# Patient Record
Sex: MALE
[De-identification: names, ages, dates, MRNs, and addresses within clinical notes are randomized per-mention and may not be internally consistent; named-entity substitution may affect disease eponyms.]

---

## 2017-03-24 ENCOUNTER — HOSPITAL ENCOUNTER (OUTPATIENT)
Dept: HOSPITAL 74 - JASU-SURG | Age: 62
Discharge: HOME | End: 2017-03-24
Attending: SPECIALIST
Payer: COMMERCIAL

## 2017-03-24 VITALS — SYSTOLIC BLOOD PRESSURE: 126 MMHG | HEART RATE: 60 BPM | DIASTOLIC BLOOD PRESSURE: 75 MMHG

## 2017-03-24 VITALS — BODY MASS INDEX: 28.1 KG/M2

## 2017-03-24 VITALS — TEMPERATURE: 97.5 F

## 2017-03-24 DIAGNOSIS — K40.90: ICD-10-CM

## 2017-03-24 DIAGNOSIS — K40.00: Primary | ICD-10-CM

## 2017-03-24 PROCEDURE — 0YUA0JZ SUPPLEMENT BILATERAL INGUINAL REGION WITH SYNTHETIC SUBSTITUTE, OPEN APPROACH: ICD-10-PCS | Performed by: SPECIALIST

## 2017-03-24 NOTE — OP
Operative Note





- Note:


Operative Date: 03/24/17


Operation: Repair of bilateral incarcerated inguinal hernia , with plug and 

mesh.  The right side was also a sliding hernia.


Findings: 


 Sliding hernia on right with a large , indirect sac , and a lipoma of the cord.


   Left inguinal hernia , direct type on the medial edge of the inguinal canal 

, with incarcerated fat.


Surgeon: Chante Kennedy


Anesthesiologist/CRNA: Teressa Mehta


Anesthesia: General


Specimens Removed: 1)Right indirect sac.  2) Lipoma of the cord on the right,.  

3) lipoma of the cord on the left.


Estimated Blood Loss (mls): 20


Operative Report Dictated: Yes

## 2017-03-25 NOTE — OP
DATE OF OPERATION:  03/24/2017

 

PREOPERATIVE DIAGNOSIS:  Bilateral incarcerated inguinal hernia.

 

POSTOPERATIVE DIAGNOSIS:  Bilateral incarcerated inguinal hernia, right-sided 
sliding

type.

 

OPERATIVE PROCEDURE:  Repair of bilateral incarcerated inguinal hernia with 
plug and

mesh with the right side also being sliding type.

 

SURGEON:  Radha Kennedy MD 

 

ANESTHESIA:  General anesthesia.

 

ANESTHESIOLOGIST:  Teressa Mehta MD 

 

OPERATIVE DESCRIPTION:  This 61-year-old man had a bilateral inguinal hernia 
with the

right being larger than the left and was painful.  The left side also was a

significant size hernia, which was not completely reducible.  Procedure was 
explained

to the patient.  Risks and benefits and complications were discussed with the

patient.  The patient was brought to the operating room.  General anesthesia was

administered.  Consent was obtained.  Both groins were painted and draped.  
First the

right-sided hernia was repaired followed by the left side in a similar fashion. 

Incision was made in the right groin, along the skin crease, which was deepened 
to

incise the skin, subcutaneous tissue, and Jennifer fascia.  The external oblique

aponeurosis was exposed circumferentially.  This was then incised along the 
direction

of its fibers in the right groin, exposing the anterior wall of the inguinal 
canal. 

The ilioinguinal nerve and iliohypogastric nerves were identified and preserved

throughout the procedure.  The cord structures were then isolated at the pubic

tubercle, around a 1/2-inch Penrose drain.  The ilioinguinal nerve was 
identified and

preserved throughout the procedure.  The external and internal spermatic muscle 
and

fascia were then incised, and the cord structures were exposed.  There was a 
large

lipoma of the cord.  This was  from the rest of the structures.  This 
was

ligated at the level of the internal ring and divided .  This was  sent to 
Pathology.  The rest of the cord structures were then exposed. 

There was a large, thick, bulky sac.  This was  from the rest of the 
cord

structures.  This was carried all the way to the internal ring, through the 
internal

ring into the abdominal wall.  The fundus of the sac was then opened.  The 
hernia  was of the

sliding type.  The medial and lateral wall was then  from the 
peritoneal

reflection, and the sliding component  was returned into the abdominal cavity.  
This was carried all

the way down towards the cecum.  This was returned to the abdominal cavity.  
The sac

was then suture ligated at the level of the internal ring with 2-0 Vicryl 
sutures and

divided distal to the ligature.  The specimen was sent to Pathology.  The 
peritoneum

behind the transverse abdominis muscle and fascia was circumferentially 


through the inguinal ring and pushed into the abdominal cavity.  The largest 
plug was

then inserted through the internal ring.  This was fixed behind the transverse

abdominis muscle and fascia by passing two 2-0 Prolene sutures above and medial 
to

the ring through the muscle, brought through the internal ring.  It then was 
passed

through the outer leaf of the plug and was then reinserted through the internal 
ring

and brought out through the abdominal wall.  Two such sutures were obtained, 
one above and

lateral to the internal ring, and a second above and medial to the internal 
ring.  Once

this was placed, the plug was then inserted through the internal ring and placed

behind the abdominal wall.  A mesh was then placed across, the shelving edge of 
the

inguinal ligament, and over the internal oblique muscle

and transverse abdominis fascia.  This was anchored at the level of the pubic

tubercle with 2-0 Prolene sutures.  The inferior leaf of the mesh was placed 
over the

swelling edge of the inguinal ligament, and anchored with a VersaTack tacking 
device. 

The superior leaf of the mesh was placed over the internal oblique muscle, and 
the 2

limbs of the Prolene suture, holding the plug behind the abdominal wall, was 
brought

through the mesh and the knot was fastened .  The lateral Prolene suture,

holding the plug behind the abdominal wall, was brought through both the split 
limbs of the

mesh as it came came around the internal ring.  It was placed through both of 
them,

forming a new internal ring, and the knot was  fastened.  A few other VersaTack

tackers were placed to anchor the mesh over the internal oblique muscle.  The 
repair

was adequately performed.  Hemostasis was satisfactory.  The wound was 
irrigated. 

The external oblique aponeurosis was approximated with continuous 3-0 Vicryl 
sutures.

 Then 0.5% Marcaine was injected into the wound through the skin and 
subcutaneous

tissue over the pubic tubercle and over the inguinal nerve and hypogastric 
notch. 

The Jennifer fascia was approximated with buried interrupted 3-0 Vicryl sutures.  
The

subcutaneous fat was approximated again with buried interrupted 3-0 Vicryl 
sutures,

and the skin was approximated with continuous 4-0 Monocryl sutures in a running

subcuticular fashion.  Estimated blood loss on the right side was less than 5 
mL. 

The wound was approximated with Dermabond.  A similar procedure was done on the 
left

side, where on the medial side of the posterior wall of the inguinal canal, was

of direct hernia with  protrusion of fat.  This fat was pushed posteriorly, 
through the internal

ring, and the plug was placed, pushing the fat posteriorly.  The plug was placed

through the internal ring.  The rest of the procedure was the same as that on 
the

right side. An oblique incision was made in the left groin, going through the

subcutaneous fat, external oblique apomeurosis,  the cord structures.
  Again, on the left

side there was a large lipoma of the cord, but there was no indirect hernia.

 A direct hernia mainly on the medial side of the posterior wall of the 
inguinal canal, through the transversalis fascia just lateral to the pubic 
tubercle.  The fat was reduced through the internal ring.  The mesh was again

anchored over the pubic tubercle just inferiorly over the shelving edge of the

inguinal ligament, and anchored with the VersaTack tackers.  Laterally it was 
brought

around the cord structures, and the Prolene suture holding the plug was passed

through both limbs of the mesh, and the knot was fastened.  The repair was 
adequate. 

The closure was done, as on the right side.  Dermabond was applied to the skin

edges.  Total estimated blood loss was less than 20 mL.  Sponge count and 
instrument

count was correct.  The patient was extubated and transferred to the recovery 
room

just in satisfactory and stable condition.

 

 

 

 

SHELDON MCKENZIE/[id number]

DD: 03/24/2017 16:45

DT: 03/25/2017 10:45

Job #:  [id number]

MTDTARA

## 2017-03-28 NOTE — PATH
Surgical Pathology Report



Patient Name:  COOPER CABRERA

Accession #:  [id number]

Med. Rec. #:  [id number]                                                        

   /Age/Gender:  1955 (Age: 61) / M

Account:  [id number]                                                          

             Location: [facility] SURGICAL

Taken:  3/24/2017

Received:  3/27/2017

Reported:  3/28/2017

Physicians:  Radha Kennedy M.D.

  



Specimen(s) Received

A: RIGHT HERNIA SAC 

B: RIGHT LIPOMA OF CORD 

C: LEFT LIPOMA OF CORD 





Clinical History

Bilateral inguinal hernia







Final Diagnosis

A. SOFT TISSUE, RIGHT INGUINAL, EXCISION:  

FIBROUS TISSUE CONSISTENT WITH HERNIA SAC.



B. SOFT TISSUE, RIGHT INGUINAL, EXCISION:  

BENIGN ADIPOSE TISSUE CONSISTENT WITH CORD LIPOMA.



C. SOFT TISSUE, LEFT INGUINAL, EXCISION:  

BENIGN ADIPOSE TISSUE CONSISTENT WITH CORD LIPOMA.









***Electronically Signed***

Clyde Rubi M.D.





Gross Description

A. Received in formalin labeled "right hernia sac," is a 5.3 x 3.0 x 1.3 cm

tan-gray, irregular portion of fibromembranous tissue with attached fat,

consistent with a hernia sac. Representative sections are submitted in one

cassette.



B. Received in formalin labeled "right lipoma of the cord," is a 4.5 x 3.8 x 1.7

cm aggregate of tan gray fibromembranous tissue and yellow, lobulated adipose

tissue. Sectioning reveals homogeneous yellow adipose tissue. Representative

sections are submitted in one cassette.



C. Received in formalin labeled "left lipoma of the cord," is a 4.8 x 2.3 x 1.5

cm portion of tan gray fibromembranous tissue and yellow, lobulated adipose

tissue. Sectioning reveals homogeneous yellow adipose tissue. Representative

sections are submitted in one cassette.

/3/27/2017



saudi/3/27/2017

## 2019-05-28 ENCOUNTER — INPATIENT (INPATIENT)
Facility: HOSPITAL | Age: 64
LOS: 2 days | Discharge: HOME | DRG: 872 | End: 2019-05-31
Attending: STUDENT IN AN ORGANIZED HEALTH CARE EDUCATION/TRAINING PROGRAM | Admitting: STUDENT IN AN ORGANIZED HEALTH CARE EDUCATION/TRAINING PROGRAM
Payer: COMMERCIAL

## 2019-05-28 VITALS
OXYGEN SATURATION: 95 % | TEMPERATURE: 98 F | RESPIRATION RATE: 16 BRPM | HEART RATE: 68 BPM | DIASTOLIC BLOOD PRESSURE: 68 MMHG | SYSTOLIC BLOOD PRESSURE: 115 MMHG

## 2019-05-28 DIAGNOSIS — K46.9 UNSPECIFIED ABDOMINAL HERNIA WITHOUT OBSTRUCTION OR GANGRENE: Chronic | ICD-10-CM

## 2019-05-28 LAB
ALBUMIN SERPL ELPH-MCNC: 3.5 G/DL — SIGNIFICANT CHANGE UP (ref 3.4–5)
ALP SERPL-CCNC: 89 U/L — SIGNIFICANT CHANGE UP (ref 40–120)
ALT FLD-CCNC: 118 U/L — HIGH (ref 12–42)
AMPHET UR-MCNC: NEGATIVE — SIGNIFICANT CHANGE UP
ANION GAP SERPL CALC-SCNC: 8 MMOL/L — LOW (ref 9–16)
APPEARANCE UR: CLEAR — SIGNIFICANT CHANGE UP
APTT BLD: 32.4 SEC — SIGNIFICANT CHANGE UP (ref 27.5–36.3)
AST SERPL-CCNC: 110 U/L — HIGH (ref 15–37)
BARBITURATES UR SCN-MCNC: NEGATIVE — SIGNIFICANT CHANGE UP
BASOPHILS NFR BLD AUTO: 0.7 % — SIGNIFICANT CHANGE UP (ref 0–2)
BENZODIAZ UR-MCNC: NEGATIVE — SIGNIFICANT CHANGE UP
BILIRUB SERPL-MCNC: 0.5 MG/DL — SIGNIFICANT CHANGE UP (ref 0.2–1.2)
BILIRUB UR-MCNC: NEGATIVE — SIGNIFICANT CHANGE UP
BUN SERPL-MCNC: 13 MG/DL — SIGNIFICANT CHANGE UP (ref 7–23)
CALCIUM SERPL-MCNC: 9 MG/DL — SIGNIFICANT CHANGE UP (ref 8.5–10.5)
CHLORIDE SERPL-SCNC: 104 MMOL/L — SIGNIFICANT CHANGE UP (ref 96–108)
CO2 SERPL-SCNC: 29 MMOL/L — SIGNIFICANT CHANGE UP (ref 22–31)
COCAINE METAB.OTHER UR-MCNC: NEGATIVE — SIGNIFICANT CHANGE UP
COLOR SPEC: YELLOW — SIGNIFICANT CHANGE UP
CREAT SERPL-MCNC: 0.85 MG/DL — SIGNIFICANT CHANGE UP (ref 0.5–1.3)
CRP SERPL-MCNC: 9.6 MG/DL — HIGH (ref 0–0.9)
D DIMER BLD IA.RAPID-MCNC: 5062 NG/ML DDU — HIGH
DIFF PNL FLD: ABNORMAL
EOSINOPHIL NFR BLD AUTO: 0 % — SIGNIFICANT CHANGE UP (ref 0–6)
FLU A RESULT: SIGNIFICANT CHANGE UP
FLU A RESULT: SIGNIFICANT CHANGE UP
FLUAV AG NPH QL: SIGNIFICANT CHANGE UP
FLUBV AG NPH QL: SIGNIFICANT CHANGE UP
GLUCOSE SERPL-MCNC: 86 MG/DL — SIGNIFICANT CHANGE UP (ref 70–99)
GLUCOSE UR QL: NEGATIVE — SIGNIFICANT CHANGE UP
HCT VFR BLD CALC: 43.8 % — SIGNIFICANT CHANGE UP (ref 39–50)
HGB BLD-MCNC: 15.1 G/DL — SIGNIFICANT CHANGE UP (ref 13–17)
IMM GRANULOCYTES NFR BLD AUTO: 0.7 % — SIGNIFICANT CHANGE UP (ref 0–1.5)
INR BLD: 1.1 — SIGNIFICANT CHANGE UP (ref 0.88–1.16)
KETONES UR-MCNC: NEGATIVE — SIGNIFICANT CHANGE UP
LACTATE SERPL-SCNC: 1.5 MMOL/L — SIGNIFICANT CHANGE UP (ref 0.4–2)
LEUKOCYTE ESTERASE UR-ACNC: NEGATIVE — SIGNIFICANT CHANGE UP
LYMPHOCYTES # BLD AUTO: 20.7 % — SIGNIFICANT CHANGE UP (ref 13–44)
MAGNESIUM SERPL-MCNC: 2 MG/DL — SIGNIFICANT CHANGE UP (ref 1.6–2.6)
MCHC RBC-ENTMCNC: 30.4 PG — SIGNIFICANT CHANGE UP (ref 27–34)
MCHC RBC-ENTMCNC: 34.5 G/DL — SIGNIFICANT CHANGE UP (ref 32–36)
MCV RBC AUTO: 88.1 FL — SIGNIFICANT CHANGE UP (ref 80–100)
METHADONE UR-MCNC: NEGATIVE — SIGNIFICANT CHANGE UP
MONOCYTES NFR BLD AUTO: 5.9 % — SIGNIFICANT CHANGE UP (ref 2–14)
NEUTROPHILS NFR BLD AUTO: 72 % — SIGNIFICANT CHANGE UP (ref 43–77)
NITRITE UR-MCNC: NEGATIVE — SIGNIFICANT CHANGE UP
NT-PROBNP SERPL-SCNC: 87 PG/ML — SIGNIFICANT CHANGE UP
OPIATES UR-MCNC: NEGATIVE — SIGNIFICANT CHANGE UP
PCP SPEC-MCNC: SIGNIFICANT CHANGE UP
PCP UR-MCNC: NEGATIVE — SIGNIFICANT CHANGE UP
PH UR: 6 — SIGNIFICANT CHANGE UP (ref 5–8)
PLATELET # BLD AUTO: 141 K/UL — LOW (ref 150–400)
POTASSIUM SERPL-MCNC: 3.8 MMOL/L — SIGNIFICANT CHANGE UP (ref 3.5–5.3)
POTASSIUM SERPL-SCNC: 3.8 MMOL/L — SIGNIFICANT CHANGE UP (ref 3.5–5.3)
PROT SERPL-MCNC: 7 G/DL — SIGNIFICANT CHANGE UP (ref 6.4–8.2)
PROT UR-MCNC: ABNORMAL MG/DL
PROTHROM AB SERPL-ACNC: 12.2 SEC — SIGNIFICANT CHANGE UP (ref 10–12.9)
RBC # BLD: 4.97 M/UL — SIGNIFICANT CHANGE UP (ref 4.2–5.8)
RBC # FLD: 14.8 % — HIGH (ref 10.3–14.5)
RBC CASTS # UR COMP ASSIST: < 5 /HPF — SIGNIFICANT CHANGE UP
RSV RESULT: SIGNIFICANT CHANGE UP
RSV RNA RESP QL NAA+PROBE: SIGNIFICANT CHANGE UP
SODIUM SERPL-SCNC: 141 MMOL/L — SIGNIFICANT CHANGE UP (ref 132–145)
SP GR SPEC: <=1.005 — SIGNIFICANT CHANGE UP (ref 1–1.03)
THC UR QL: NEGATIVE — SIGNIFICANT CHANGE UP
TROPONIN I SERPL-MCNC: <0.017 NG/ML — LOW (ref 0.02–0.06)
TSH SERPL-MCNC: 1.87 UIU/ML — SIGNIFICANT CHANGE UP (ref 0.36–3.74)
UROBILINOGEN FLD QL: 0.2 E.U./DL — SIGNIFICANT CHANGE UP
WBC # BLD: 2.7 K/UL — LOW (ref 3.8–10.5)
WBC # FLD AUTO: 2.7 K/UL — LOW (ref 3.8–10.5)
WBC UR QL: < 5 /HPF — SIGNIFICANT CHANGE UP

## 2019-05-28 PROCEDURE — 70450 CT HEAD/BRAIN W/O DYE: CPT | Mod: 26,59

## 2019-05-28 PROCEDURE — 93010 ELECTROCARDIOGRAM REPORT: CPT | Mod: NC

## 2019-05-28 PROCEDURE — 71275 CT ANGIOGRAPHY CHEST: CPT | Mod: 26

## 2019-05-28 PROCEDURE — 70498 CT ANGIOGRAPHY NECK: CPT | Mod: 26

## 2019-05-28 PROCEDURE — 99223 1ST HOSP IP/OBS HIGH 75: CPT | Mod: AI

## 2019-05-28 PROCEDURE — 99285 EMERGENCY DEPT VISIT HI MDM: CPT | Mod: 25

## 2019-05-28 PROCEDURE — 70496 CT ANGIOGRAPHY HEAD: CPT | Mod: 26

## 2019-05-28 PROCEDURE — 99284 EMERGENCY DEPT VISIT MOD MDM: CPT

## 2019-05-28 RX ORDER — CEFTRIAXONE 500 MG/1
1 INJECTION, POWDER, FOR SOLUTION INTRAMUSCULAR; INTRAVENOUS ONCE
Refills: 0 | Status: COMPLETED | OUTPATIENT
Start: 2019-05-28 | End: 2019-05-28

## 2019-05-28 RX ORDER — ACETAMINOPHEN 500 MG
975 TABLET ORAL ONCE
Refills: 0 | Status: COMPLETED | OUTPATIENT
Start: 2019-05-28 | End: 2019-05-28

## 2019-05-28 RX ORDER — SODIUM CHLORIDE 9 MG/ML
1000 INJECTION INTRAMUSCULAR; INTRAVENOUS; SUBCUTANEOUS ONCE
Refills: 0 | Status: COMPLETED | OUTPATIENT
Start: 2019-05-28 | End: 2019-05-28

## 2019-05-28 RX ORDER — SODIUM CHLORIDE 9 MG/ML
1000 INJECTION INTRAMUSCULAR; INTRAVENOUS; SUBCUTANEOUS
Refills: 0 | Status: DISCONTINUED | OUTPATIENT
Start: 2019-05-28 | End: 2019-05-29

## 2019-05-28 RX ORDER — MECLIZINE HCL 12.5 MG
25 TABLET ORAL ONCE
Refills: 0 | Status: COMPLETED | OUTPATIENT
Start: 2019-05-28 | End: 2019-05-28

## 2019-05-28 RX ORDER — DIAZEPAM 5 MG
5 TABLET ORAL ONCE
Refills: 0 | Status: DISCONTINUED | OUTPATIENT
Start: 2019-05-28 | End: 2019-05-28

## 2019-05-28 RX ORDER — METOCLOPRAMIDE HCL 10 MG
10 TABLET ORAL ONCE
Refills: 0 | Status: COMPLETED | OUTPATIENT
Start: 2019-05-28 | End: 2019-05-28

## 2019-05-28 RX ORDER — IBUPROFEN 200 MG
600 TABLET ORAL ONCE
Refills: 0 | Status: COMPLETED | OUTPATIENT
Start: 2019-05-28 | End: 2019-05-28

## 2019-05-28 RX ADMIN — CEFTRIAXONE 100 GRAM(S): 500 INJECTION, POWDER, FOR SOLUTION INTRAMUSCULAR; INTRAVENOUS at 21:00

## 2019-05-28 RX ADMIN — Medication 25 MILLIGRAM(S): at 16:40

## 2019-05-28 RX ADMIN — Medication 600 MILLIGRAM(S): at 23:58

## 2019-05-28 RX ADMIN — SODIUM CHLORIDE 1000 MILLILITER(S): 9 INJECTION INTRAMUSCULAR; INTRAVENOUS; SUBCUTANEOUS at 16:40

## 2019-05-28 RX ADMIN — Medication 5 MILLIGRAM(S): at 16:40

## 2019-05-28 RX ADMIN — Medication 10 MILLIGRAM(S): at 16:40

## 2019-05-28 RX ADMIN — Medication 975 MILLIGRAM(S): at 21:43

## 2019-05-28 RX ADMIN — SODIUM CHLORIDE 150 MILLILITER(S): 9 INJECTION INTRAMUSCULAR; INTRAVENOUS; SUBCUTANEOUS at 21:00

## 2019-05-28 RX ADMIN — Medication 975 MILLIGRAM(S): at 21:00

## 2019-05-28 RX ADMIN — SODIUM CHLORIDE 1000 MILLILITER(S): 9 INJECTION INTRAMUSCULAR; INTRAVENOUS; SUBCUTANEOUS at 21:43

## 2019-05-28 RX ADMIN — Medication 600 MILLIGRAM(S): at 23:04

## 2019-05-28 NOTE — H&P ADULT - HISTORY OF PRESENT ILLNESS
63 yr old M, POOR HISTORIAN with no significant PMH presents to [facility] 5/28/19 c/o headache, dizziness and weakness x 3 days.  Patient states he has experienced similar symptoms of lesser severity over the past 2 years, worsened over the past 3 days. Patient states he is experiencing dizziness even when lying still and when his eyes are closed. Patient reports he last saw a neurologist 6 days ago and was cleared from acute neuro abnormalities. Pt has also seen an ENT specialist, had nasal scope done and was advised to get a CT scan and MRI.     Patient denies any chest pain, SOB, palpitations, fever/chills, vomiting, diarrhea, abdominal pain, PND, orthopnea, LE edema, recent travel or sick contacts.  In [facility], BP: 115/68, HR: 68, RR:16, Temp: 98.1F, O2 sat: 95% RA.   EKG revealed Afib@116BPM with no acute ST/T wave changes. Cardiac enzymes negative x 1 set. D-dimer elevated 5062.   CT head revealed no acute abnormality, right parieto-occipital encephalomalacia and gliosis likely sequelae of prior infarct and/or contusion. CT Angio Head and neck were unremarkable.   CT Angio Chest negative for PE.     While in triage patient noted to have Tmax of 102.3F, no leukocytosis, lactic acid within normal limits. UA unremarkable. Flu A/B and RSV negative. Utox negative. Blood cultures were drawn.       Patient treated with Ceftriaxone 1g IV x 1 dose, Tylenol 975mg PO x 1 dose, Meclizine 25mg PO x 1 dose, Reglan 10mg IV x 1 dose and was started on IV fluid hydration.     Cardiology Dr. Khan was consulted, limited echo c/w preserved LV function, mild MR and TR, normal LA.    Patient currently stable, transferred to [facility] for cardiac telemetry, medical management of Afib and further work-up. 63 yr old M, POOR HISTORIAN with PMHx of hyperlipidemia, GERD presents to [facility] 5/28/19 c/o headache, dizziness and weakness x 3 days.  Patient states he has experienced similar symptoms of lesser severity over the past 2 years, worsened over the past 3 days. Patient states he is experiencing dizziness even when lying still and when his eyes are closed. Patient states he can barely walk 25 feet prior to feeling "unsteady". Patient reports he last saw a neurologist 6 days ago and was cleared from acute neuro abnormalities. Pt has also seen an ENT specialist, had nasal scope done and was advised to get a CT scan and MRI.   Patient denies any chest pain, SOB, palpitations, fever/chills, vomiting, diarrhea, abdominal pain, PND, orthopnea, LE edema, recent travel or sick contacts. Patient does report recent URI for which he was prescribed Augmentin, currently on day 8/10.  In [facility], BP: 115/68, HR: 68, RR:16, Temp: 98.1F, O2 sat: 95% RA. EKG revealed Afib@116BPM with no acute ST/T wave changes. Cardiac enzymes negative x 1 set. D-dimer elevated 5062.   CT head revealed no acute abnormality, right parieto-occipital encephalomalacia and gliosis likely sequelae of prior infarct and/or contusion. CT Angio Head and neck were unremarkable. CT Angio Chest negative for PE.     While in triage patient noted to have Tmax of 102.3F, no leukocytosis, lactic acid within normal limits. UA unremarkable. Flu A/B and RSV negative. Utox negative. Blood cultures were drawn.       Patient treated with Ceftriaxone 1g IV x 1 dose, Tylenol 975mg PO x 1 dose, Meclizine 25mg PO x 1 dose, Reglan 10mg IV x 1 dose and was started on IV fluid hydration.     Cardiology Dr. Khan was consulted, limited bedside echo c/w preserved LV function, mild MR and TR, normal LA.    Patient currently stable, transferred to [facility] for cardiac telemetry, medical management of Afib and further work-up.

## 2019-05-28 NOTE — CONSULT NOTE ADULT - SUBJECTIVE AND OBJECTIVE BOX
[facility] Cardiology Consultation    CHIEF COMPLAINT: dizziness    HISTORY OF PRESENT ILLNESS: 64 y/o male (poor historian) presented secondary to dizziness and a headache. No chest discomfort or dyspnea reported. Patient's heart rate noted to be irregular on exam and he was noted to be in AF on arrival. No chest discomfort noted. No syncope. Unclear duration of symptoms.     PAST MEDICAL & SURGICAL HISTORY:  denies       Allergies No Known Allergies    MEDICATIONS:  none              FAMILY HISTORY: none    SOCIAL HISTORY:  no EtOH, tobacco or drug use    REVIEW OF SYSTEMS:  CONSTITUTIONAL: No fever, weight loss, or fatigue  EYES: No eye pain, visual disturbances, or discharge  ENMT:  No difficulty hearing, tinnitus, vertigo; No sinus or throat pain  NECK: No pain or stiffness  BREASTS: No pain, masses, or nipple discharge  RESPIRATORY: No cough, wheezing, chills or hemoptysis; No Shortness of Breath  CARDIOVASCULAR: No chest pain, palpitations, dizziness, or leg swelling  GASTROINTESTINAL: No abdominal or epigastric pain. No nausea, vomiting, or hematemesis; No diarrhea or constipation. No melena or hematochezia.  GENITOURINARY: No dysuria, frequency, hematuria, or incontinence  NEUROLOGICAL: No headaches, memory loss, loss of strength, numbness, or tremors  SKIN: No itching, burning, rashes, or lesions   LYMPH Nodes: No enlarged glands  ENDOCRINE: No heat or cold intolerance; No hair loss  MUSCULOSKELETAL: No joint pain or swelling; No muscle, back, or extremity pain  PSYCHIATRIC: No depression, anxiety, mood swings, or difficulty sleeping  HEME/LYMPH: No easy bruising, or bleeding gums  ALLERY AND IMMUNOLOGIC: No hives or eczema	      PHYSICAL EXAM:  T(C): 36.7 (05-28-19 @ 16:05), Max: 36.7 (05-28-19 @ 16:05)  HR: 68 (05-28-19 @ 16:05) (68 - 68)  BP: 115/68 (05-28-19 @ 16:05) (115/68 - 115/68)  RR: 16 (05-28-19 @ 16:05) (16 - 16)  SpO2: 95% (05-28-19 @ 16:05) (95% - 95%)  Appearance: middle aged man NAD   HEENT:   Normal oral mucosa, PERRL, EOMI	  Lymphatic: No lymphadenopathy  Cardiovascular: Normal S1 S2, No JVD, No murmurs, No edema  Respiratory: Lungs clear to auscultation	  Psychiatry: A & O x 3, Mood & affect appropriate  Gastrointestinal:  Soft, Non-tender, + BS	  Skin: No rashes, No ecchymoses, No cyanosis	  Neurologic: Non-focal  Extremities: Normal range of motion, No clubbing, cyanosis or edema  Vascular: Peripheral pulses palpable 2+ bilaterally  	    ECG:  	af with no st-t changes    LABS:	 	  CARDIAC MARKERS:  Troponin I, Serum: <0.017 ng/mL (05-28 @ 16:40)                                  15.1   2.7   )-----------( 141      ( 28 May 2019 16:40 )             43.8     05-28    141  |  104  |  13  ----------------------------<  86  3.8   |  29  |  0.85    Ca    9.0      28 May 2019 16:40  Mg     2.0     05-28    TPro  7.0  /  Alb  3.5  /  TBili  0.5  /  DBili  x   /  AST  110<H>  /  ALT  118<H>  /  AlkPhos  89  05-28    proBNP: Serum Pro-Brain Natriuretic Peptide: 87 pg/mL (05-28 @ 16:40)    TSH: Thyroid Stimulating Hormone, Serum: 1.871 uIU/mL (05-28 @ 16:40)      ASSESSMENT/PLAN: 	64 y/o male with AF  1. patient seen and examined, chart reviewed  2. limited echo c/w preserved LV function, mild MR and TR, normal LA  3. ct head pending  4. will likely need admission for SHANNON/DCCV    I spent 45 min in care of this patient

## 2019-05-28 NOTE — ED PROVIDER NOTE - OBJECTIVE STATEMENT
60 y o male denying PMHx presents to ED for c/o intermittent spinning dizziness and headache for the past x2 years, worsened over the past x3 days. Notes dizziness even when lying still and when his eyes are closed. Last saw a neurologist x6 days ago and was cleared for acute neuro abnormalities. Pt has also seen an ENT specialist, had nasal scope done, and was told he needed a CT scan and MRI. Pt notes associated nausea. Denies abd pain, syncope, fevers, chills, blurred vision, chest pain, vomiting, SOB, or other sx.

## 2019-05-28 NOTE — ED PROVIDER NOTE - CLINICAL SUMMARY MEDICAL DECISION MAKING FREE TEXT BOX
Patient presenting with apparent vertigo with headache. Noted to be in AF with slight RVR. ED dejesus done to eval for PE, poss central circulation issues or TIA. Admitted to Cards for further eval. Spiked a temp during ED visit- covered with abx , flu swab neg. No clear source.

## 2019-05-28 NOTE — ED ADULT NURSE REASSESSMENT NOTE - NS ED NURSE REASSESS COMMENT FT1
received pt from MARY GRACE Arreola- pt febrile at this time. c/o slight headache. Notified MD Stacy. pt placed on cardiac monitor.

## 2019-05-28 NOTE — H&P ADULT - PROBLEM SELECTOR PLAN 2
resolved, CT head revealed no acute abnormality, right parieto-occipital encephalomalacia and gliosis likely sequelae of prior infarct and/or contusion. CT Angio Head and neck were unremarkable.

## 2019-05-28 NOTE — H&P ADULT - ATTENDING COMMENTS
Assessment: Patient personally seen and examined myself during rounds with the   Physician Assistant  ON DATE 5/28/19    Note read, including vitals, physical findings, laboratory data, and radiological reports.   Agree with above and revisions, if any, included below.  - New atrial fibrillation  - Severe arrythmia  - Unstable hemodynamics  - Plan of Care: continuous telemetry monitoring for arrhythmias, echocardiogram to evaluate ejection fraction and central pressures, daily weights and I/Os, serial EKGs and lab work to evaluate and replete potassium, magnesium. Troponin added to labs and will cycle. Will need to be seen by EP or Interv Cardio if intervention needed.

## 2019-05-28 NOTE — ED ADULT NURSE NOTE - NSIMPLEMENTINTERV_GEN_ALL_ED
Implemented All Universal Safety Interventions:  [place] to call system. Call bell, personal items and telephone within reach. Instruct patient to call for assistance. Room bathroom lighting operational. Non-slip footwear when patient is off stretcher. Physically safe environment: no spills, clutter or unnecessary equipment. Stretcher in lowest position, wheels locked, appropriate side rails in place.

## 2019-05-28 NOTE — ED PROVIDER NOTE - PROGRESS NOTE DETAILS
Spoke to Dr. Groves regarding pt. Believes it is unlikely acute ischemic affect. Agrees w/ plan for CT head and CT angio head and neck. Dr. Alford of cardiology will see pt for new AFib. Received from Dr. Jimenez.  Labs, imaging reviewed.  Patient subjectively feels better with mild persistent dizziness.  Pending CTA chest r/o PE.  Also discussed cardiologists recommendation for admission. patient now noted to have a fever of 102.3 with HR in the low 100s.  Leukopenia, normal coags.  Patient already received 2L NS started around 7 pm.  Ordered cultures, lactate, urine testing.  Denies cough, body aches, diarrhea. UA, flu testing clear.  Leukopenia, mild transaminitis.  Patient walked to the bathroom with persistent mild dizziness.  [id number] score 0, no AC at this time.  Rate well  controlled.  Supple neck, no light sensitivity.  Frontal headache has been off and on for "years".  Very low suspicion for CNS infection.  Reviewed with on call intensivist Dr. Bowser as well as Dr. Nye who will accept to his service.  Blood cultures, urine cultures

## 2019-05-28 NOTE — H&P ADULT - ASSESSMENT
63 yr old M, POOR HISTORIAN with PMHx of hyperlipidemia, GERD presents to [facility] 5/28/19 c/o headache, dizziness and weakness x 3 days, EKG revealed Afib@116BPM with no acute ST/T wave changes, Cardiac enzymes negative x 1 set, D-dimer elevated 5062, CT head without acute findings, CT angio chest negative for PE.   Patient currently stable, transferred to [facility] for cardiac telemetry, medical management of Afib and further work-up.

## 2019-05-28 NOTE — H&P ADULT - NSHPLABSRESULTS_GEN_ALL_CORE
EKG revealed Afib@116BPM with no acute ST/T wave changes EKG@[facility] revealed Afib@116BPM with no acute ST/T wave changes    EKG@[facility] at 12:20AM 5/29 revealed NSR@81BPM without acute ST/T wave changes.

## 2019-05-28 NOTE — H&P ADULT - PROBLEM SELECTOR PLAN 5
Tmax 102.3F@[facility], no leukocytosis, lactic acid within normal limits. UA unremarkable. Flu A/B and RSV negative. Utox negative. Blood cultures were drawn.   --Patient given Ceftriaxone 1g IV x 1 dose and Tylenol 975mg PO x 1 dose @[facility].   --Patient admits to recent URI for which he has been on Augmentin BID as outpatient, currently on day 8/10. (last dose 5/30/19 PM)      VTE PPx: Heparin subcut

## 2019-05-28 NOTE — ED PROVIDER NOTE - CARE PLAN
Principal Discharge DX:	Atrial fibrillation, unspecified type  Secondary Diagnosis:	Febrile illness, acute

## 2019-05-28 NOTE — H&P ADULT - NSHPSOCIALHISTORY_GEN_ALL_CORE
Tobacco/ETOH/illicit drugs: denies Tobacco: quit 30 yrs ago, previously 1/2 ppd x 5 yrs  ETOH/illicit drugs: denies

## 2019-05-29 DIAGNOSIS — E78.5 HYPERLIPIDEMIA, UNSPECIFIED: ICD-10-CM

## 2019-05-29 DIAGNOSIS — R79.89 OTHER SPECIFIED ABNORMAL FINDINGS OF BLOOD CHEMISTRY: ICD-10-CM

## 2019-05-29 DIAGNOSIS — R50.9 FEVER, UNSPECIFIED: ICD-10-CM

## 2019-05-29 DIAGNOSIS — R42 DIZZINESS AND GIDDINESS: ICD-10-CM

## 2019-05-29 DIAGNOSIS — I48.91 UNSPECIFIED ATRIAL FIBRILLATION: ICD-10-CM

## 2019-05-29 DIAGNOSIS — R94.5 ABNORMAL RESULTS OF LIVER FUNCTION STUDIES: ICD-10-CM

## 2019-05-29 LAB
ALBUMIN SERPL ELPH-MCNC: 3.5 G/DL — SIGNIFICANT CHANGE UP (ref 3.3–5)
ALP SERPL-CCNC: 100 U/L — SIGNIFICANT CHANGE UP (ref 40–120)
ALT FLD-CCNC: 99 U/L — HIGH (ref 10–45)
AMYLASE P1 CFR SERPL: 74 U/L — SIGNIFICANT CHANGE UP (ref 25–125)
ANION GAP SERPL CALC-SCNC: 10 MMOL/L — SIGNIFICANT CHANGE UP (ref 5–17)
ANISOCYTOSIS BLD QL: SLIGHT — SIGNIFICANT CHANGE UP
APTT BLD: 32.8 SEC — SIGNIFICANT CHANGE UP (ref 27.5–36.3)
APTT BLD: >200 SEC — CRITICAL HIGH (ref 27.5–36.3)
AST SERPL-CCNC: 100 U/L — HIGH (ref 10–40)
BASOPHILS # BLD AUTO: 0 K/UL — SIGNIFICANT CHANGE UP (ref 0–0.2)
BASOPHILS NFR BLD AUTO: 0 % — SIGNIFICANT CHANGE UP (ref 0–2)
BILIRUB DIRECT SERPL-MCNC: <0.2 MG/DL — SIGNIFICANT CHANGE UP (ref 0–0.2)
BILIRUB INDIRECT FLD-MCNC: >0.2 MG/DL — SIGNIFICANT CHANGE UP (ref 0.2–1)
BILIRUB SERPL-MCNC: 0.4 MG/DL — SIGNIFICANT CHANGE UP (ref 0.2–1.2)
BUN SERPL-MCNC: 9 MG/DL — SIGNIFICANT CHANGE UP (ref 7–23)
CALCIUM SERPL-MCNC: 8.7 MG/DL — SIGNIFICANT CHANGE UP (ref 8.4–10.5)
CHLORIDE SERPL-SCNC: 105 MMOL/L — SIGNIFICANT CHANGE UP (ref 96–108)
CHOLEST SERPL-MCNC: 108 MG/DL — SIGNIFICANT CHANGE UP (ref 10–199)
CK MB CFR SERPL CALC: 1.2 NG/ML — SIGNIFICANT CHANGE UP (ref 0–6.7)
CK MB CFR SERPL CALC: <1 NG/ML — SIGNIFICANT CHANGE UP (ref 0–6.7)
CK SERPL-CCNC: 120 U/L — SIGNIFICANT CHANGE UP (ref 30–200)
CK SERPL-CCNC: 122 U/L — SIGNIFICANT CHANGE UP (ref 30–200)
CO2 SERPL-SCNC: 27 MMOL/L — SIGNIFICANT CHANGE UP (ref 22–31)
CREAT SERPL-MCNC: 0.87 MG/DL — SIGNIFICANT CHANGE UP (ref 0.5–1.3)
EOSINOPHIL # BLD AUTO: 0 K/UL — SIGNIFICANT CHANGE UP (ref 0–0.5)
EOSINOPHIL NFR BLD AUTO: 0 % — SIGNIFICANT CHANGE UP (ref 0–6)
ERYTHROCYTE [SEDIMENTATION RATE] IN BLOOD: 3 MM/HR — SIGNIFICANT CHANGE UP
GGT SERPL-CCNC: 94 U/L — HIGH (ref 9–50)
GIANT PLATELETS BLD QL SMEAR: PRESENT — SIGNIFICANT CHANGE UP
GLUCOSE SERPL-MCNC: 93 MG/DL — SIGNIFICANT CHANGE UP (ref 70–99)
HAV IGM SER-ACNC: SIGNIFICANT CHANGE UP
HAV IGM SER-ACNC: SIGNIFICANT CHANGE UP
HBA1C BLD-MCNC: 5.4 % — SIGNIFICANT CHANGE UP (ref 4–5.6)
HBV CORE IGM SER-ACNC: SIGNIFICANT CHANGE UP
HBV CORE IGM SER-ACNC: SIGNIFICANT CHANGE UP
HBV SURFACE AG SER-ACNC: SIGNIFICANT CHANGE UP
HBV SURFACE AG SER-ACNC: SIGNIFICANT CHANGE UP
HCT VFR BLD CALC: 43.6 % — SIGNIFICANT CHANGE UP (ref 39–50)
HCT VFR BLD CALC: 45.6 % — SIGNIFICANT CHANGE UP (ref 39–50)
HCV AB S/CO SERPL IA: 0.11 S/CO — SIGNIFICANT CHANGE UP
HCV AB S/CO SERPL IA: 0.22 S/CO — SIGNIFICANT CHANGE UP (ref 0–0.99)
HCV AB S/CO SERPL IA: 0.22 S/CO — SIGNIFICANT CHANGE UP (ref 0–0.99)
HCV AB SERPL-IMP: SIGNIFICANT CHANGE UP
HDLC SERPL-MCNC: 37 MG/DL — LOW
HGB BLD-MCNC: 14.5 G/DL — SIGNIFICANT CHANGE UP (ref 13–17)
HGB BLD-MCNC: 14.9 G/DL — SIGNIFICANT CHANGE UP (ref 13–17)
LIDOCAIN IGE QN: 27 U/L — SIGNIFICANT CHANGE UP (ref 7–60)
LIPID PNL WITH DIRECT LDL SERPL: 53 MG/DL — SIGNIFICANT CHANGE UP
LYMPHOCYTES # BLD AUTO: 1.12 K/UL — SIGNIFICANT CHANGE UP (ref 1–3.3)
LYMPHOCYTES # BLD AUTO: 26.5 % — SIGNIFICANT CHANGE UP (ref 13–44)
MAGNESIUM SERPL-MCNC: 1.9 MG/DL — SIGNIFICANT CHANGE UP (ref 1.6–2.6)
MANUAL SMEAR VERIFICATION: SIGNIFICANT CHANGE UP
MCHC RBC-ENTMCNC: 29.8 PG — SIGNIFICANT CHANGE UP (ref 27–34)
MCHC RBC-ENTMCNC: 29.9 PG — SIGNIFICANT CHANGE UP (ref 27–34)
MCHC RBC-ENTMCNC: 32.7 GM/DL — SIGNIFICANT CHANGE UP (ref 32–36)
MCHC RBC-ENTMCNC: 33.3 GM/DL — SIGNIFICANT CHANGE UP (ref 32–36)
MCV RBC AUTO: 89.7 FL — SIGNIFICANT CHANGE UP (ref 80–100)
MCV RBC AUTO: 91.4 FL — SIGNIFICANT CHANGE UP (ref 80–100)
MONOCYTES # BLD AUTO: 0.22 K/UL — SIGNIFICANT CHANGE UP (ref 0–0.9)
MONOCYTES NFR BLD AUTO: 5.3 % — SIGNIFICANT CHANGE UP (ref 2–14)
NEUTROPHILS # BLD AUTO: 2.77 K/UL — SIGNIFICANT CHANGE UP (ref 1.8–7.4)
NEUTROPHILS NFR BLD AUTO: 35.4 % — LOW (ref 43–77)
NEUTS BAND # BLD: 30.1 % — HIGH (ref 0–8)
NRBC # BLD: 0 /100 WBCS — SIGNIFICANT CHANGE UP (ref 0–0)
NRBC # BLD: 0 /100 WBCS — SIGNIFICANT CHANGE UP (ref 0–0)
PLAT MORPH BLD: ABNORMAL
PLATELET # BLD AUTO: 122 K/UL — LOW (ref 150–400)
PLATELET # BLD AUTO: 125 K/UL — LOW (ref 150–400)
POIKILOCYTOSIS BLD QL AUTO: SLIGHT — SIGNIFICANT CHANGE UP
POTASSIUM SERPL-MCNC: 4.3 MMOL/L — SIGNIFICANT CHANGE UP (ref 3.5–5.3)
POTASSIUM SERPL-SCNC: 4.3 MMOL/L — SIGNIFICANT CHANGE UP (ref 3.5–5.3)
PROT SERPL-MCNC: 6.2 G/DL — SIGNIFICANT CHANGE UP (ref 6–8.3)
RAPID RVP RESULT: SIGNIFICANT CHANGE UP
RBC # BLD: 4.86 M/UL — SIGNIFICANT CHANGE UP (ref 4.2–5.8)
RBC # BLD: 4.99 M/UL — SIGNIFICANT CHANGE UP (ref 4.2–5.8)
RBC # FLD: 14.7 % — HIGH (ref 10.3–14.5)
RBC # FLD: 15 % — HIGH (ref 10.3–14.5)
RBC BLD AUTO: ABNORMAL
SMUDGE CELLS # BLD: PRESENT — SIGNIFICANT CHANGE UP
SODIUM SERPL-SCNC: 142 MMOL/L — SIGNIFICANT CHANGE UP (ref 135–145)
T4 AB SER-ACNC: 6.46 UG/DL — SIGNIFICANT CHANGE UP (ref 3.17–11.72)
TOTAL CHOLESTEROL/HDL RATIO MEASUREMENT: 2.9 RATIO — LOW (ref 3.4–9.6)
TRIGL SERPL-MCNC: 89 MG/DL — SIGNIFICANT CHANGE UP (ref 10–149)
TROPONIN T SERPL-MCNC: <0.01 NG/ML — SIGNIFICANT CHANGE UP (ref 0–0.01)
TROPONIN T SERPL-MCNC: <0.01 NG/ML — SIGNIFICANT CHANGE UP (ref 0–0.01)
TSH SERPL-MCNC: 1.41 UIU/ML — SIGNIFICANT CHANGE UP (ref 0.35–4.94)
VARIANT LYMPHS # BLD: 2.7 % — SIGNIFICANT CHANGE UP (ref 0–6)
WBC # BLD: 3.5 K/UL — LOW (ref 3.8–10.5)
WBC # BLD: 4.23 K/UL — SIGNIFICANT CHANGE UP (ref 3.8–10.5)
WBC # FLD AUTO: 3.5 K/UL — LOW (ref 3.8–10.5)
WBC # FLD AUTO: 4.23 K/UL — SIGNIFICANT CHANGE UP (ref 3.8–10.5)

## 2019-05-29 PROCEDURE — 99232 SBSQ HOSP IP/OBS MODERATE 35: CPT

## 2019-05-29 PROCEDURE — 76700 US EXAM ABDOM COMPLETE: CPT | Mod: 26

## 2019-05-29 PROCEDURE — 93306 TTE W/DOPPLER COMPLETE: CPT | Mod: 26

## 2019-05-29 PROCEDURE — 99223 1ST HOSP IP/OBS HIGH 75: CPT | Mod: GC

## 2019-05-29 PROCEDURE — 93010 ELECTROCARDIOGRAM REPORT: CPT

## 2019-05-29 PROCEDURE — 93970 EXTREMITY STUDY: CPT | Mod: 26

## 2019-05-29 RX ORDER — ACETAMINOPHEN 500 MG
650 TABLET ORAL EVERY 6 HOURS
Refills: 0 | Status: DISCONTINUED | OUTPATIENT
Start: 2019-05-29 | End: 2019-05-29

## 2019-05-29 RX ORDER — HEPARIN SODIUM 5000 [USP'U]/ML
5000 INJECTION INTRAVENOUS; SUBCUTANEOUS EVERY 8 HOURS
Refills: 0 | Status: DISCONTINUED | OUTPATIENT
Start: 2019-05-29 | End: 2019-05-29

## 2019-05-29 RX ORDER — PANTOPRAZOLE SODIUM 20 MG/1
1 TABLET, DELAYED RELEASE ORAL
Qty: 0 | Refills: 0 | DISCHARGE

## 2019-05-29 RX ORDER — HEPARIN SODIUM 5000 [USP'U]/ML
INJECTION INTRAVENOUS; SUBCUTANEOUS
Qty: 25000 | Refills: 0 | Status: DISCONTINUED | OUTPATIENT
Start: 2019-05-29 | End: 2019-05-30

## 2019-05-29 RX ORDER — HEPARIN SODIUM 5000 [USP'U]/ML
3000 INJECTION INTRAVENOUS; SUBCUTANEOUS EVERY 6 HOURS
Refills: 0 | Status: DISCONTINUED | OUTPATIENT
Start: 2019-05-29 | End: 2019-05-30

## 2019-05-29 RX ORDER — ATORVASTATIN CALCIUM 80 MG/1
20 TABLET, FILM COATED ORAL AT BEDTIME
Refills: 0 | Status: DISCONTINUED | OUTPATIENT
Start: 2019-05-29 | End: 2019-05-31

## 2019-05-29 RX ORDER — FLUTICASONE PROPIONATE 50 MCG
1 SPRAY, SUSPENSION NASAL
Qty: 0 | Refills: 0 | DISCHARGE

## 2019-05-29 RX ORDER — HEPARIN SODIUM 5000 [USP'U]/ML
6500 INJECTION INTRAVENOUS; SUBCUTANEOUS EVERY 6 HOURS
Refills: 0 | Status: DISCONTINUED | OUTPATIENT
Start: 2019-05-29 | End: 2019-05-30

## 2019-05-29 RX ORDER — MAGNESIUM OXIDE 400 MG ORAL TABLET 241.3 MG
400 TABLET ORAL ONCE
Refills: 0 | Status: COMPLETED | OUTPATIENT
Start: 2019-05-29 | End: 2019-05-29

## 2019-05-29 RX ORDER — OXYCODONE AND ACETAMINOPHEN 5; 325 MG/1; MG/1
1 TABLET ORAL ONCE
Refills: 0 | Status: DISCONTINUED | OUTPATIENT
Start: 2019-05-29 | End: 2019-05-29

## 2019-05-29 RX ORDER — PANTOPRAZOLE SODIUM 20 MG/1
40 TABLET, DELAYED RELEASE ORAL
Refills: 0 | Status: DISCONTINUED | OUTPATIENT
Start: 2019-05-29 | End: 2019-05-31

## 2019-05-29 RX ORDER — ASPIRIN/CALCIUM CARB/MAGNESIUM 324 MG
81 TABLET ORAL DAILY
Refills: 0 | Status: DISCONTINUED | OUTPATIENT
Start: 2019-05-29 | End: 2019-05-31

## 2019-05-29 RX ORDER — METOPROLOL TARTRATE 50 MG
12.5 TABLET ORAL
Refills: 0 | Status: DISCONTINUED | OUTPATIENT
Start: 2019-05-29 | End: 2019-05-31

## 2019-05-29 RX ORDER — ACETAMINOPHEN 500 MG
650 TABLET ORAL EVERY 6 HOURS
Refills: 0 | Status: DISCONTINUED | OUTPATIENT
Start: 2019-05-29 | End: 2019-05-31

## 2019-05-29 RX ORDER — ACETAMINOPHEN 500 MG
650 TABLET ORAL ONCE
Refills: 0 | Status: COMPLETED | OUTPATIENT
Start: 2019-05-29 | End: 2019-05-29

## 2019-05-29 RX ORDER — HEPARIN SODIUM 5000 [USP'U]/ML
6500 INJECTION INTRAVENOUS; SUBCUTANEOUS ONCE
Refills: 0 | Status: COMPLETED | OUTPATIENT
Start: 2019-05-29 | End: 2019-05-29

## 2019-05-29 RX ORDER — IBUPROFEN 200 MG
400 TABLET ORAL ONCE
Refills: 0 | Status: COMPLETED | OUTPATIENT
Start: 2019-05-29 | End: 2019-05-29

## 2019-05-29 RX ORDER — FLUTICASONE PROPIONATE 50 MCG
1 SPRAY, SUSPENSION NASAL
Refills: 0 | Status: DISCONTINUED | OUTPATIENT
Start: 2019-05-29 | End: 2019-05-31

## 2019-05-29 RX ORDER — IBUPROFEN 200 MG
400 TABLET ORAL ONCE
Refills: 0 | Status: DISCONTINUED | OUTPATIENT
Start: 2019-05-29 | End: 2019-05-29

## 2019-05-29 RX ADMIN — HEPARIN SODIUM 6500 UNIT(S): 5000 INJECTION INTRAVENOUS; SUBCUTANEOUS at 16:30

## 2019-05-29 RX ADMIN — Medication 650 MILLIGRAM(S): at 20:30

## 2019-05-29 RX ADMIN — Medication 1 TABLET(S): at 17:07

## 2019-05-29 RX ADMIN — PANTOPRAZOLE SODIUM 40 MILLIGRAM(S): 20 TABLET, DELAYED RELEASE ORAL at 06:45

## 2019-05-29 RX ADMIN — Medication 650 MILLIGRAM(S): at 14:40

## 2019-05-29 RX ADMIN — HEPARIN SODIUM 1500 UNIT(S)/HR: 5000 INJECTION INTRAVENOUS; SUBCUTANEOUS at 16:32

## 2019-05-29 RX ADMIN — Medication 650 MILLIGRAM(S): at 19:39

## 2019-05-29 RX ADMIN — HEPARIN SODIUM 5000 UNIT(S): 5000 INJECTION INTRAVENOUS; SUBCUTANEOUS at 06:45

## 2019-05-29 RX ADMIN — Medication 650 MILLIGRAM(S): at 10:50

## 2019-05-29 RX ADMIN — Medication 12.5 MILLIGRAM(S): at 06:46

## 2019-05-29 RX ADMIN — Medication 12.5 MILLIGRAM(S): at 17:07

## 2019-05-29 RX ADMIN — Medication 400 MILLIGRAM(S): at 23:46

## 2019-05-29 RX ADMIN — Medication 1 TABLET(S): at 06:45

## 2019-05-29 RX ADMIN — OXYCODONE AND ACETAMINOPHEN 1 TABLET(S): 5; 325 TABLET ORAL at 17:08

## 2019-05-29 RX ADMIN — Medication 81 MILLIGRAM(S): at 17:08

## 2019-05-29 RX ADMIN — Medication 1 SPRAY(S): at 06:45

## 2019-05-29 RX ADMIN — OXYCODONE AND ACETAMINOPHEN 1 TABLET(S): 5; 325 TABLET ORAL at 18:08

## 2019-05-29 RX ADMIN — ATORVASTATIN CALCIUM 20 MILLIGRAM(S): 80 TABLET, FILM COATED ORAL at 21:34

## 2019-05-29 RX ADMIN — HEPARIN SODIUM 0 UNIT(S)/HR: 5000 INJECTION INTRAVENOUS; SUBCUTANEOUS at 23:11

## 2019-05-29 RX ADMIN — MAGNESIUM OXIDE 400 MG ORAL TABLET 400 MILLIGRAM(S): 241.3 TABLET ORAL at 10:50

## 2019-05-29 NOTE — PROGRESS NOTE ADULT - ASSESSMENT
64yo M, POOR HISTORIAN with PMHx of hyperlipidemia, GERD presents to [facility] 5/28/19 c/o headache, dizziness and weakness x 3 days. He was found to be febrile, in new afib and was transferred to [facility] for further management.

## 2019-05-29 NOTE — PROGRESS NOTE ADULT - PROBLEM SELECTOR PLAN 5
LFTs elevated on admit, slightly downtrended  - s/p splenectomy, multiple round structures in LUQ consistent with splenosis. Similar to CT angiogram dated 5/28/2019. Pancreas not well visualized, appears normal on CT.    DVT PPx: Heparin gtt  DISPO: Pending clinical progression    Case discussed with Dr. Nye LFTs elevated on admit, slightly downtrended  - s/p splenectomy, multiple round structures in LUQ consistent with splenosis. Similar to CT angiogram dated 5/28/2019. Pancreas not well visualized, appears normal on CT.  - hepatitis panel negative    DVT PPx: Heparin gtt  DISPO: Pending clinical progression    Case discussed with Dr. Nye

## 2019-05-29 NOTE — CONSULT NOTE ADULT - ASSESSMENT
63M w/PMH of splenectomy (42 years ago), b/l inguinal hernia repair (2017), ?GERD vs gastritis on EGD 1 year ago p/w to [facility] with weakness, dizziness and headaches for past 4-5 days, found to be in rapid afib and admitted ot 5U for further cardiac workup.  Medicine consulted in setting of fever and elevated LFTS.     #Fevers: unclear source, ddx includes infection, malignancy, or autoimmune process such as vasculitis vs rheumatoid arthritis vs giant cell arteritis.  -CBC from 5/29 w/30% bands and Tmax of 102. Pt's only complaint is headache, dizziness and transient visual changes 4 days ago.  -CTPE w/out evidence of consolidation, FluA/B/RSV negative, UA negative  -Blood cultures pending    -Would send FUO lab workup, typically done after 3 weeks of high grade fevers, however pt reports he has been treated with 4 courses of abx for 3 months.  Send ESR, CRP, LDH, quantiferon, HIV (pt consented), RF, CPK, MICHAEL, SPEP.  -Recommend ID and rheumatology consult     Discussed with attending 63M w/PMH of splenectomy (42 years ago), b/l inguinal hernia repair (2017), ?GERD vs gastritis on EGD 1 year ago p/w to [facility] with weakness, dizziness and headaches for past 4-5 days, found to be in rapid afib and admitted ot 5U for further cardiac workup.  Medicine consulted in setting of fever and elevated LFTS.     #Fevers: unclear source, ddx includes infection, malignancy, or autoimmune process such as vasculitis vs rheumatoid arthritis vs giant cell arteritis.  -CBC from 5/29 w/30% bands and Tmax of 102. Pt's only complaint is headache, dizziness and transient visual changes 4 days ago.  -CTPE w/out evidence of consolidation, FluA/B/RSV negative, UA negative  -Check RVP   -Blood cultures pending    -Would send FUO lab workup, typically done after 3 weeks of high grade fevers, however pt reports he has been treated with 4 courses of abx for 3 months.  Send ESR, CRP, LDH, quantiferon, HIV (pt consented), RF, CPK, MICHAEL, SPEP.  -Recommend ID and rheumatology consult     #Elevated LFTs: Elevated GGT, AST/ALT almost 1:1 ratio, <4x upper limit of normal suggesting possible NAFLD, unlikely etoh related as pt quit over 1 year ago or viral hepatitis as elevations would be expected to be much higher at >25 upper limit of normal and hepatitis panel negative.   -Normal synthetic liver function   -RUQ w/normal gallbladder, biliary ducts and liver   -Trend CMP    Discussed with attending 63M w/PMH of splenectomy (42 years ago), b/l inguinal hernia repair (2017), ?GERD vs gastritis on EGD 1 year ago p/w to [facility] with weakness, dizziness and headaches for past 4-5 days, found to be in rapid afib and admitted ot 5U for further cardiac workup.  Medicine consulted in setting of fever and elevated LFTS.     #Fevers: unclear source, ddx includes infection, malignancy, or autoimmune process such as vasculitis vs rheumatoid arthritis vs giant cell arteritis.  -CBC from 5/29 w/30% bands and Tmax of 102. Pt's only complaint is headache, dizziness and transient visual changes 4 days ago.  -CTPE w/out evidence of consolidation, FluA/B/RSV negative, UA negative  -Check RVP   -Blood cultures pending    -Would send FUO lab workup, typically done after 3 weeks of high grade fevers, however pt reports he has been treated with 4 courses of abx for 3 months.  Send ESR, CRP, LDH, quantiferon, HIV (pt consented), RF, CPK, MICHAEL, SPEP.  -Recommend ID consult, Rheum consult if ESR is elevated >50    #Elevated LFTs: Elevated GGT, AST/ALT almost 1:1 ratio, <4x upper limit of normal suggesting possible NAFLD, unlikely etoh related as pt quit over 1 year ago or viral hepatitis as elevations would be expected to be much higher at >25 upper limit of normal and hepatitis panel negative.   -Normal synthetic liver function   -RUQ w/normal gallbladder, biliary ducts and liver   -Trend CMP    Discussed with attending

## 2019-05-29 NOTE — PROGRESS NOTE ADULT - PROBLEM SELECTOR PLAN 3
Tmax 102.3F@[facility], today 102.1F   - CRP 9.6, d-dimer 5062  - No WBC. lactic acid within normal limits. UA unremarkable. Flu A/B and RSV negative. Utox negative.  - follow up blood cultures sent in [facility]  - follow up full RVP  - Patient given Ceftriaxone 1g IV x 1 dose and Tylenol 975mg PO x 1 dose @[facility]. Patient admits to recent URI for which he has been on Augmentin BID as outpatient, currently on day 8/10. (last dose 5/30/19 PM). Will continue for now.  - Medicine consulted, follow up recs

## 2019-05-29 NOTE — PROGRESS NOTE ADULT - SUBJECTIVE AND OBJECTIVE BOX
Interventional Cardiology PA Adult Progress Note    CC: "I have a headache"    Subjective Assessment: Patient was seen and examined this AM and reported HA. He denies palpitations, SOB or CP.    ROS otherwise negative except per subjective  	  MEDICATIONS:  metoprolol tartrate 12.5 milliGRAM(s) Oral two times a day  amoxicillin  875 milliGRAM(s)/clavulanate 1 Tablet(s) Oral every 12 hours  acetaminophen   Tablet .. 650 milliGRAM(s) Oral every 6 hours PRN  pantoprazole    Tablet 40 milliGRAM(s) Oral before breakfast  atorvastatin 20 milliGRAM(s) Oral at bedtime  aspirin enteric coated 81 milliGRAM(s) Oral daily  fluticasone propionate 50 MICROgram(s)/spray Nasal Spray 1 Spray(s) Both Nostrils two times a day  heparin  Infusion.  Unit(s)/Hr IV Continuous <Continuous>  	    [PHYSICAL EXAM:  TELEMETRY:  T(C): 37.1 (05-29-19 @ 14:39), Max: 39.1 (05-28-19 @ 20:55)  HR: 80 (05-29-19 @ 14:39) (68 - 101)  BP: 131/77 (05-29-19 @ 14:39) (107/78 - 140/80)  RR: 18 (05-29-19 @ 14:39) (15 - 19)  SpO2: 94% (05-29-19 @ 14:39) (93% - 98%)  Wt(kg): --  I&O's Summary    28 May 2019 07:01  -  29 May 2019 07:00  --------------------------------------------------------  IN: 160 mL / OUT: 500 mL / NET: -340 mL      Height (cm): 172.72 (05-29 @ 01:07)  Weight (kg): 85.2 (05-29 @ 01:07)  BMI (kg/m2): 28.6 (05-29 @ 01:07)  BSA (m2): 1.99 (05-29 @ 01:07)                                        Appearance: Normal	  HEENT:   Normal oral mucosa, PERRL, EOMI	  Neck: Supple, - JVD  Cardiovascular: Normal S1 S2, No JVD, No murmur  Respiratory: Lungs clear to auscultation, No Rales, Rhonchi, Wheezing	  Gastrointestinal:  Soft, Non-tender, + BS	  Skin: No rashes, No ecchymoses, No cyanosis  Extremities: Normal range of motion, No clubbing, cyanosis or edema  Vascular: Peripheral pulses palpable 2+ bilaterally  Neurologic: Non-focal  Psychiatry: A & O x 3, Mood & affect appropriate  	    LABS:	 	  CARDIAC MARKERS:  Troponin I, Serum: <0.017 ng/mL (05-28 @ 16:40)    Troponin I, Serum: <0.017 ng/mL (05-28 @ 16:40)                          14.9   4.23  )-----------( 125      ( 29 May 2019 06:26 )             45.6     05-29    142  |  105  |  9   ----------------------------<  93  4.3   |  27  |  0.87    Ca    8.7      29 May 2019 06:26  Mg     1.9     05-29    TPro  6.2  /  Alb  3.5  /  TBili  0.4  /  DBili  <0.2  /  AST  100<H>  /  ALT  99<H>  /  AlkPhos  100  05-29    proBNP: Serum Pro-Brain Natriuretic Peptide: 87 pg/mL (05-28 @ 16:40)    HgA1c: Hemoglobin A1C, Whole Blood: 5.4 % (05-29 @ 06:26)    TSH: Thyroid Stimulating Hormone, Serum: 1.408 uIU/mL (05-29 @ 06:26)  Thyroid Stimulating Hormone, Serum: 1.871 uIU/mL (05-28 @ 16:40)    PT/INR - ( 28 May 2019 16:40 )   PT: 12.2 sec;   INR: 1.10          PTT - ( 28 May 2019 16:40 )  PTT:32.4 sec

## 2019-05-29 NOTE — PATIENT PROFILE ADULT - [id number]_GEN_[id number]_NUR
individual instruction/verbal instruction/pictorial/skill demonstration/computer/internet/group instruction/video/written material

## 2019-05-29 NOTE — CONSULT NOTE ADULT - SUBJECTIVE AND OBJECTIVE BOX
CORY MONTENEGRO  63y  Male      Patient is a 63y old  Male who presents with a chief complaint of headache, dizziness and weakness x 3 days. (28 May 2019 23:33)      INTERVAL HPI/OVERNIGHT EVENTS:        REVIEW OF SYSTEMS:  CONSTITUTIONAL: No fever, weight loss, or fatigue  EYES: No eye pain, visual disturbances, or discharge  ENMT:  No difficulty hearing, tinnitus, vertigo; No sinus or throat pain  NECK: No pain or stiffness  BREASTS: No pain, masses, or nipple discharge  RESPIRATORY: No cough, wheezing, chills or hemoptysis; No shortness of breath  CARDIOVASCULAR: No chest pain, palpitations, dizziness, or leg swelling  GASTROINTESTINAL: No abdominal or epigastric pain. No nausea, vomiting, or hematemesis; No diarrhea or constipation. No melena or hematochezia.  GENITOURINARY: No dysuria, frequency, hematuria, or incontinence  NEUROLOGICAL: No headaches, memory loss, loss of strength, numbness, or tremors  SKIN: No itching, burning, rashes, or lesions   LYMPH NODES: No enlarged glands  ENDOCRINE: No heat or cold intolerance; No hair loss  MUSCULOSKELETAL: No joint pain or swelling; No muscle, back, or extremity pain  PSYCHIATRIC: No depression, anxiety, mood swings, or difficulty sleeping  HEME/LYMPH: No easy bruising, or bleeding gums  ALLERY AND IMMUNOLOGIC: No hives or eczema    T(C): 38.9 (05-29-19 @ 10:03), Max: 39.1 (05-28-19 @ 20:55)  HR: 86 (05-29-19 @ 10:38) (68 - 101)  BP: 137/74 (05-29-19 @ 10:38) (107/78 - 140/80)  RR: 17 (05-29-19 @ 10:38) (15 - 19)  SpO2: 95% (05-29-19 @ 10:38) (93% - 98%)  Wt(kg): --Vital Signs Last 24 Hrs  T(C): 38.9 (29 May 2019 10:03), Max: 39.1 (28 May 2019 20:55)  T(F): 102.1 (29 May 2019 10:03), Max: 102.3 (28 May 2019 20:55)  HR: 86 (29 May 2019 10:38) (68 - 101)  BP: 137/74 (29 May 2019 10:38) (107/78 - 140/80)  BP(mean): --  RR: 17 (29 May 2019 10:38) (15 - 19)  SpO2: 95% (29 May 2019 10:38) (93% - 98%)    PHYSICAL EXAM:  GENERAL: NAD, well-groomed, well-developed  HEAD:  Atraumatic, Normocephalic  EYES: EOMI, PERRLA, conjunctiva and sclera clear  ENMT: No tonsillar erythema, exudates, or enlargement; Moist mucous membranes, Good dentition, No lesions  NECK: Supple, No JVD, Normal thyroid  NERVOUS SYSTEM:  Alert & Oriented X3, Good concentration; Motor Strength 5/5 B/L upper and lower extremities; DTRs 2+ intact and symmetric  CHEST/LUNG: Clear to percussion bilaterally; No rales, rhonchi, wheezing, or rubs  HEART: Regular rate and rhythm; No murmurs, rubs, or gallops  ABDOMEN: Soft, Nontender, Nondistended; Bowel sounds present  EXTREMITIES:  2+ Peripheral Pulses, No clubbing, cyanosis, or edema  LYMPH: No lymphadenopathy noted  SKIN: No rashes or lesions    Consultant(s) Notes Reviewed:  [x ] YES  [ ] NO  Care Discussed with Consultants/Other Providers [ x] YES  [ ] NO    LABS:                        14.9   4.23  )-----------( 125      ( 29 May 2019 06:26 )             45.6     05-29    142  |  105  |  9   ----------------------------<  93  4.3   |  27  |  0.87    Ca    8.7      29 May 2019 06:26  Mg     1.9     05-29    TPro  6.2  /  Alb  3.5  /  TBili  0.4  /  DBili  <0.2  /  AST  100<H>  /  ALT  99<H>  /  AlkPhos  100  05-29    PT/INR - ( 28 May 2019 16:40 )   PT: 12.2 sec;   INR: 1.10          PTT - ( 28 May 2019 16:40 )  PTT:32.4 sec  Urinalysis Basic - ( 28 May 2019 22:12 )    Color: Yellow / Appearance: Clear / SG: <=1.005 / pH: x  Gluc: x / Ketone: NEGATIVE  / Bili: NEGATIVE / Urobili: 0.2 E.U./dL   Blood: x / Protein: Trace mg/dL / Nitrite: NEGATIVE   Leuk Esterase: NEGATIVE / RBC: < 5 /HPF / WBC < 5 /HPF   Sq Epi: x / Non Sq Epi: x / Bacteria: x      CAPILLARY BLOOD GLUCOSE      POCT Blood Glucose.: 87 mg/dL (28 May 2019 16:28)        Urinalysis Basic - ( 28 May 2019 22:12 )    Color: Yellow / Appearance: Clear / SG: <=1.005 / pH: x  Gluc: x / Ketone: NEGATIVE  / Bili: NEGATIVE / Urobili: 0.2 E.U./dL   Blood: x / Protein: Trace mg/dL / Nitrite: NEGATIVE   Leuk Esterase: NEGATIVE / RBC: < 5 /HPF / WBC < 5 /HPF   Sq Epi: x / Non Sq Epi: x / Bacteria: x        RADIOLOGY & ADDITIONAL TESTS:    Imaging Personally Reviewed:  [ ] YES  [ ] NO CORY MONTENEGRO  63y  Male    Patient is a 63y old  Male who presents with a chief complaint of headache, dizziness and weakness x 3 days. (28 May 2019 23:33)  63 yr old M, POOR HISTORIAN with PMHx of hyperlipidemia, GERD presents to [facility] 5/28/19 c/o headache, dizziness and weakness x 3 days.  "Patient states he has experienced similar symptoms of lesser severity over the past 2 years, worsened over the past 3 days. Patient states he is experiencing dizziness even when lying still and when his eyes are closed. Patient states he can barely walk 25 feet prior to feeling "unsteady". Patient reports he last saw a neurologist 6 days ago and was cleared from acute neuro abnormalities. Pt has also seen an ENT specialist, had nasal scope done and was advised to get a CT scan and MRI.   Patient denies any chest pain, SOB, palpitations, fever/chills, vomiting, diarrhea, abdominal pain, PND, orthopnea, LE edema, recent travel or sick contacts. Patient does report recent URI for which he was prescribed Augmentin, currently on day 8/10.  In [facility], BP: 115/68, HR: 68, RR:16, Temp: 98.1F, O2 sat: 95% RA. EKG revealed Afib@116BPM with no acute ST/T wave changes. Cardiac enzymes negative x 1 set. D-dimer elevated 5062.   CT head revealed no acute abnormality, right parieto-occipital encephalomalacia and gliosis likely sequelae of prior infarct and/or contusion. CT Angio Head and neck were unremarkable. CT Angio Chest negative for PE.   While in triage patient noted to have Tmax of 102.3F, no leukocytosis, lactic acid within normal limits. UA unremarkable. Flu A/B and RSV negative. Utox negative. Blood cultures were drawn.   Patient treated with Ceftriaxone 1g IV x 1 dose, Tylenol 975mg PO x 1 dose, Meclizine 25mg PO x 1 dose, Reglan 10mg IV x 1 dose and was started on IV fluid hydration.   Cardiology Dr. Khan was consulted, limited bedside echo c/w preserved LV function, mild MR and TR, normal LA.  Patient currently stable, transferred to [facility] for cardiac telemetry, medical management of Afib and further work-up."    INTERVAL HPI/OVERNIGHT EVENTS:        REVIEW OF SYSTEMS:  CONSTITUTIONAL: No fever, weight loss, or fatigue  EYES: No eye pain, visual disturbances, or discharge  ENMT:  No difficulty hearing, tinnitus, vertigo; No sinus or throat pain  NECK: No pain or stiffness  BREASTS: No pain, masses, or nipple discharge  RESPIRATORY: No cough, wheezing, chills or hemoptysis; No shortness of breath  CARDIOVASCULAR: No chest pain, palpitations, dizziness, or leg swelling  GASTROINTESTINAL: No abdominal or epigastric pain. No nausea, vomiting, or hematemesis; No diarrhea or constipation. No melena or hematochezia.  GENITOURINARY: No dysuria, frequency, hematuria, or incontinence  NEUROLOGICAL: No headaches, memory loss, loss of strength, numbness, or tremors  SKIN: No itching, burning, rashes, or lesions   LYMPH NODES: No enlarged glands  ENDOCRINE: No heat or cold intolerance; No hair loss  MUSCULOSKELETAL: No joint pain or swelling; No muscle, back, or extremity pain  PSYCHIATRIC: No depression, anxiety, mood swings, or difficulty sleeping  HEME/LYMPH: No easy bruising, or bleeding gums  ALLERY AND IMMUNOLOGIC: No hives or eczema    T(C): 38.9 (05-29-19 @ 10:03), Max: 39.1 (05-28-19 @ 20:55)  HR: 86 (05-29-19 @ 10:38) (68 - 101)  BP: 137/74 (05-29-19 @ 10:38) (107/78 - 140/80)  RR: 17 (05-29-19 @ 10:38) (15 - 19)  SpO2: 95% (05-29-19 @ 10:38) (93% - 98%)  Wt(kg): --Vital Signs Last 24 Hrs  T(C): 38.9 (29 May 2019 10:03), Max: 39.1 (28 May 2019 20:55)  T(F): 102.1 (29 May 2019 10:03), Max: 102.3 (28 May 2019 20:55)  HR: 86 (29 May 2019 10:38) (68 - 101)  BP: 137/74 (29 May 2019 10:38) (107/78 - 140/80)  BP(mean): --  RR: 17 (29 May 2019 10:38) (15 - 19)  SpO2: 95% (29 May 2019 10:38) (93% - 98%)    PHYSICAL EXAM:  GENERAL: NAD, well-groomed, well-developed  HEAD:  Atraumatic, Normocephalic  EYES: EOMI, PERRLA, conjunctiva and sclera clear  ENMT: No tonsillar erythema, exudates, or enlargement; Moist mucous membranes, Good dentition, No lesions  NECK: Supple, No JVD, Normal thyroid  NERVOUS SYSTEM:  Alert & Oriented X3, Good concentration; Motor Strength 5/5 B/L upper and lower extremities; DTRs 2+ intact and symmetric  CHEST/LUNG: Clear to percussion bilaterally; No rales, rhonchi, wheezing, or rubs  HEART: Regular rate and rhythm; No murmurs, rubs, or gallops  ABDOMEN: Soft, Nontender, Nondistended; Bowel sounds present  EXTREMITIES:  2+ Peripheral Pulses, No clubbing, cyanosis, or edema  LYMPH: No lymphadenopathy noted  SKIN: No rashes or lesions    Consultant(s) Notes Reviewed:  [x ] YES  [ ] NO  Care Discussed with Consultants/Other Providers [ x] YES  [ ] NO    LABS:                        14.9   4.23  )-----------( 125      ( 29 May 2019 06:26 )             45.6     05-29    142  |  105  |  9   ----------------------------<  93  4.3   |  27  |  0.87    Ca    8.7      29 May 2019 06:26  Mg     1.9     05-29    TPro  6.2  /  Alb  3.5  /  TBili  0.4  /  DBili  <0.2  /  AST  100<H>  /  ALT  99<H>  /  AlkPhos  100  05-29    PT/INR - ( 28 May 2019 16:40 )   PT: 12.2 sec;   INR: 1.10          PTT - ( 28 May 2019 16:40 )  PTT:32.4 sec  Urinalysis Basic - ( 28 May 2019 22:12 )    Color: Yellow / Appearance: Clear / SG: <=1.005 / pH: x  Gluc: x / Ketone: NEGATIVE  / Bili: NEGATIVE / Urobili: 0.2 E.U./dL   Blood: x / Protein: Trace mg/dL / Nitrite: NEGATIVE   Leuk Esterase: NEGATIVE / RBC: < 5 /HPF / WBC < 5 /HPF   Sq Epi: x / Non Sq Epi: x / Bacteria: x      CAPILLARY BLOOD GLUCOSE      POCT Blood Glucose.: 87 mg/dL (28 May 2019 16:28)        Urinalysis Basic - ( 28 May 2019 22:12 )    Color: Yellow / Appearance: Clear / SG: <=1.005 / pH: x  Gluc: x / Ketone: NEGATIVE  / Bili: NEGATIVE / Urobili: 0.2 E.U./dL   Blood: x / Protein: Trace mg/dL / Nitrite: NEGATIVE   Leuk Esterase: NEGATIVE / RBC: < 5 /HPF / WBC < 5 /HPF   Sq Epi: x / Non Sq Epi: x / Bacteria: x        RADIOLOGY & ADDITIONAL TESTS:    Imaging Personally Reviewed:  [ ] YES  [ ] NO CORY MONTENEGRO  63y  Male    Patient is a 63y old  Male who presents with a chief complaint of headache, dizziness and weakness x 3 days. (28 May 2019 23:33)  63 yr old M, POOR HISTORIAN with PMHx of hyperlipidemia, GERD presents to [facility] 5/28/19 c/o headache, dizziness and weakness x 3 days.  "Patient states he has experienced similar symptoms of lesser severity over the past 2 years, worsened over the past 3 days. Patient states he is experiencing dizziness even when lying still and when his eyes are closed. Patient states he can barely walk 25 feet prior to feeling "unsteady". Patient reports he last saw a neurologist 6 days ago and was cleared from acute neuro abnormalities. Pt has also seen an ENT specialist, had nasal scope done and was advised to get a CT scan and MRI.   Patient denies any chest pain, SOB, palpitations, fever/chills, vomiting, diarrhea, abdominal pain, PND, orthopnea, LE edema, recent travel or sick contacts. Patient does report recent URI for which he was prescribed Augmentin, currently on day 8/10.  In [facility], BP: 115/68, HR: 68, RR:16, Temp: 98.1F, O2 sat: 95% RA. EKG revealed Afib@116BPM with no acute ST/T wave changes. Cardiac enzymes negative x 1 set. D-dimer elevated 5062.   CT head revealed no acute abnormality, right parieto-occipital encephalomalacia and gliosis likely sequelae of prior infarct and/or contusion. CT Angio Head and neck were unremarkable. CT Angio Chest negative for PE.   While in triage patient noted to have Tmax of 102.3F, no leukocytosis, lactic acid within normal limits. UA unremarkable. Flu A/B and RSV negative. Utox negative. Blood cultures were drawn.   Patient treated with Ceftriaxone 1g IV x 1 dose, Tylenol 975mg PO x 1 dose, Meclizine 25mg PO x 1 dose, Reglan 10mg IV x 1 dose and was started on IV fluid hydration.   Cardiology Dr. Khan was consulted, limited bedside echo c/w preserved LV function, mild MR and TR, normal LA.  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 Pt reports headache is located in bilateral temples, constant, throbbing, unchanged since it started, worse with neck flexion and extension. He reports dizziness as more of a instability, but cannot describe it further. Pt reports b/l visual changes 4 days ago that he describes as flashing lights, dark vision, "half/half". Also neck tenderness w/movement, but NO pain. He reports that has since resolved. He reports subjective fevers the last few days, but has been compliant with PO abx.   Reports his son and wife had viral illnesses recently.  Denies travel, no animal bites, exposures. Denies IVDU or tattoes.   Pt reports that he has been on 4 courses of abx in the last 3 months for presumed bacterial sinus infections. He currently is on Augmentin Day 7/8 as prescribed by PCP, Dr. Wong De Leon ([phone]). He reports seeing ENT and they ruled out sinus infection. He also saw a neurologist 6 days ago and was cleared.   Pt had splenectomy 42 years ago after traumatic soccer injury, and reports getting all necessary vaccinations from his PMD in the last 10 years.       At [facility], EKG w/ Afib at 116, no ST changes. CE negative x1, D-dimer elevated to 5062. 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 CTPE negative.   Patient currently stable, transferred to [facility] for cardiac telemetry, medical management of Afib and further work-up.         REVIEW OF SYSTEMS:  CONSTITUTIONAL: +fever, no weight loss, or fatigue  EYES: No eye pain, visual disturbances, or discharge  ENMT:  No difficulty hearing, tinnitus, vertigo; No sinus or throat pain  NECK: No pain or stiffness  BREASTS: No pain, masses, or nipple discharge  RESPIRATORY: No cough, wheezing, chills or hemoptysis; No shortness of breath  CARDIOVASCULAR: No chest pain, palpitations, dizziness, or leg swelling  GASTROINTESTINAL: No abdominal or epigastric pain. No nausea, vomiting, or hematemesis; No diarrhea or constipation. No melena or hematochezia.  GENITOURINARY: No dysuria, frequency, hematuria, or incontinence  NEUROLOGICAL: No headaches, memory loss, loss of strength, numbness, or tremors  SKIN: +hx of petechial rash dorsal surface of b/l hands,  No itching, burning, or lesions   LYMPH NODES: +reports submandibular LAD that improved with Abx  ENDOCRINE: No heat or cold intolerance; No hair loss  MUSCULOSKELETAL: No joint pain or swelling; No muscle, back, or extremity pain  PSYCHIATRIC: No depression, anxiety, mood swings, or difficulty sleeping  HEME/LYMPH: No easy bruising, or bleeding gums  ALLERY AND IMMUNOLOGIC: No hives or eczema    T(C): 38.9 (05-29-19 @ 10:03), Max: 39.1 (05-28-19 @ 20:55)  HR: 86 (05-29-19 @ 10:38) (68 - 101)  BP: 137/74 (05-29-19 @ 10:38) (107/78 - 140/80)  RR: 17 (05-29-19 @ 10:38) (15 - 19)  SpO2: 95% (05-29-19 @ 10:38) (93% - 98%)  Wt(kg): --Vital Signs Last 24 Hrs  T(C): 38.9 (29 May 2019 10:03), Max: 39.1 (28 May 2019 20:55)  T(F): 102.1 (29 May 2019 10:03), Max: 102.3 (28 May 2019 20:55)  HR: 86 (29 May 2019 10:38) (68 - 101)  BP: 137/74 (29 May 2019 10:38) (107/78 - 140/80)  BP(mean): --  RR: 17 (29 May 2019 10:38) (15 - 19)  SpO2: 95% (29 May 2019 10:38) (93% - 98%)    PHYSICAL EXAM:  GENERAL: NAD, well-groomed, well-developed  HEAD:  Atraumatic, Normocephalic  EYES: EOMI, PERRLA, conjunctiva and sclera clear  ENMT: No tonsillar erythema, exudates, or enlargement; Moist mucous membranes, Good dentition, No lesions  NECK: Supple, No JVD, Normal thyroid  NERVOUS SYSTEM:  Alert & Oriented X3, Good concentration; Motor Strength 5/5 B/L upper and lower extremities; DTRs 2+ intact and symmetric  CHEST/LUNG: Clear to percussion bilaterally; No rales, rhonchi, wheezing, or rubs  HEART: Regular rate and rhythm; No murmurs, rubs, or gallops  ABDOMEN: Soft, Nontender, Nondistended; Bowel sounds present  EXTREMITIES:  2+ Peripheral Pulses, No clubbing, cyanosis, or edema  LYMPH: No lymphadenopathy noted  SKIN: No rashes or lesions    Consultant(s) Notes Reviewed:  [x ] YES  [ ] NO  Care Discussed with Consultants/Other Providers [ x] YES  [ ] NO    LABS:                        14.9   4.23  )-----------( 125      ( 29 May 2019 06:26 )             45.6     05-29    142  |  105  |  9   ----------------------------<  93  4.3   |  27  |  0.87    Ca    8.7      29 May 2019 06:26  Mg     1.9     05-29    TPro  6.2  /  Alb  3.5  /  TBili  0.4  /  DBili  <0.2  /  AST  100<H>  /  ALT  99<H>  /  AlkPhos  100  05-29    PT/INR - ( 28 May 2019 16:40 )   PT: 12.2 sec;   INR: 1.10          PTT - ( 28 May 2019 16:40 )  PTT:32.4 sec  Urinalysis Basic - ( 28 May 2019 22:12 )    Color: Yellow / Appearance: Clear / SG: <=1.005 / pH: x  Gluc: x / Ketone: NEGATIVE  / Bili: NEGATIVE / Urobili: 0.2 E.U./dL   Blood: x / Protein: Trace mg/dL / Nitrite: NEGATIVE   Leuk Esterase: NEGATIVE / RBC: < 5 /HPF / WBC < 5 /HPF   Sq Epi: x / Non Sq Epi: x / Bacteria: x      CAPILLARY BLOOD GLUCOSE      POCT Blood Glucose.: 87 mg/dL (28 May 2019 16:28)        Urinalysis Basic - ( 28 May 2019 22:12 )    Color: Yellow / Appearance: Clear / SG: <=1.005 / pH: x  Gluc: x / Ketone: NEGATIVE  / Bili: NEGATIVE / Urobili: 0.2 E.U./dL   Blood: x / Protein: Trace mg/dL / Nitrite: NEGATIVE   Leuk Esterase: NEGATIVE / RBC: < 5 /HPF / WBC < 5 /HPF   Sq Epi: x / Non Sq Epi: x / Bacteria: x        RADIOLOGY & ADDITIONAL TESTS:    Imaging Personally Reviewed:  [ ] YES  [ ] NO CORY MONTENEGRO  63y  Male    Patient is a 63y old  Male who presents with a chief complaint of headache, dizziness and weakness x 3 days. (28 May 2019 23:33)  63 yr old M, POOR HISTORIAN with PMHx of hyperlipidemia, GERD presents to [facility] 5/28/19 c/o headache, dizziness and weakness x 3 days.  "Patient states he has experienced similar symptoms of lesser severity over the past 2 years, worsened over the past 3 days. Patient states he is experiencing dizziness even when lying still and when his eyes are closed. Patient states he can barely walk 25 feet prior to feeling "unsteady". Patient reports he last saw a neurologist 6 days ago and was cleared from acute neuro abnormalities. Pt has also seen an ENT specialist, had nasal scope done and was advised to get a CT scan and MRI.   Patient denies any chest pain, SOB, palpitations, fever/chills, vomiting, diarrhea, abdominal pain, PND, orthopnea, LE edema, recent travel or sick contacts. Patient does report recent URI for which he was prescribed Augmentin, currently on day 8/10.  In [facility], BP: 115/68, HR: 68, RR:16, Temp: 98.1F, O2 sat: 95% RA. EKG revealed Afib@116BPM with no acute ST/T wave changes. Cardiac enzymes negative x 1 set. D-dimer elevated 5062.   CT head revealed no acute abnormality, right parieto-occipital encephalomalacia and gliosis likely sequelae of prior infarct and/or contusion. CT Angio Head and neck were unremarkable. CT Angio Chest negative for PE.   While in triage patient noted to have Tmax of 102.3F, no leukocytosis, lactic acid within normal limits. UA unremarkable. Flu A/B and RSV negative. Utox negative. Blood cultures were drawn.   Patient treated with Ceftriaxone 1g IV x 1 dose, Tylenol 975mg PO x 1 dose, Meclizine 25mg PO x 1 dose, Reglan 10mg IV x 1 dose and was started on IV fluid hydration.   Cardiology Dr. Khan was consulted, limited bedside echo c/w preserved LV function, mild MR and TR, normal LA.  Patient currently stable, transferred to [facility] for cardiac telemetry, medical management of Afib and further work-up."    INTERVAL HPI/OVERNIGHT EVENTS:  63M w/PMH of splenectomy (42 years ago), b/l inguinal hernia repair (2017), ?GERD vs gastritis on EGD 1 year ago p/w to [facility] with weakness, dizziness and headaches for past 4-5 days. Pt reports headache is located in bilateral temples, constant, throbbing, unchanged since it started, worse with neck flexion and extension. He reports dizziness as more of a instability, but cannot describe it further. Pt reports b/l visual changes 4 days ago that he describes as flashing lights, dark vision, "half/half". Also neck tenderness w/movement, but NO pain. He reports that has since resolved. He reports subjective fevers the last few days, but has been compliant with PO abx.   Reports his son and wife had viral illnesses recently.  Denies travel, no animal bites, exposures. Denies IVDU or tattoes. No FH of autoimmune diseases or malignancy  Pt reports that he has been on 4 courses of abx in the last 3 months for presumed bacterial sinus infections. He currently is on Augmentin Day 7/8 as prescribed by PCP, Dr. Wong De Leon ([phone]). He reports seeing ENT and they ruled out sinus infection. He also saw a neurologist 6 days ago and was cleared.   Pt had splenectomy 42 years ago after traumatic soccer injury, and reports getting all necessary vaccinations from his PMD in the last 10 years.     At [facility], EKG w/ Afib at 116, no ST changes. CE negative x1, D-dimer elevated to 5062. 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 CTA head and neck negative. CTPE negative.   Patient currently stable, transferred to [facility] for cardiac telemetry, medical management of Afib and further work-up.         REVIEW OF SYSTEMS:  CONSTITUTIONAL: +fever, no weight loss, or fatigue  EYES: No eye pain, visual disturbances, or discharge  ENMT:  No difficulty hearing, tinnitus, vertigo; No sinus or throat pain  NECK: No pain or stiffness  BREASTS: No pain, masses, or nipple discharge  RESPIRATORY: No cough, wheezing, chills or hemoptysis; No shortness of breath  CARDIOVASCULAR: No chest pain, palpitations, dizziness, or leg swelling  GASTROINTESTINAL: No abdominal or epigastric pain. No nausea, vomiting, or hematemesis; No diarrhea or constipation. No melena or hematochezia.  GENITOURINARY: No dysuria, frequency, hematuria, or incontinence  NEUROLOGICAL: No headaches, memory loss, loss of strength, numbness, or tremors  SKIN: +hx of petechial rash dorsal surface of b/l hands,  No itching, burning, or lesions   LYMPH NODES: +reports submandibular LAD that improved with Abx  ENDOCRINE: No heat or cold intolerance; No hair loss  MUSCULOSKELETAL: No joint pain or swelling; No muscle, back, or extremity pain  PSYCHIATRIC: No depression, anxiety, mood swings, or difficulty sleeping  HEME/LYMPH: No easy bruising, or bleeding gums  ALLERY AND IMMUNOLOGIC: No hives or eczema    T(C): 38.9 (05-29-19 @ 10:03), Max: 39.1 (05-28-19 @ 20:55)  HR: 86 (05-29-19 @ 10:38) (68 - 101)  BP: 137/74 (05-29-19 @ 10:38) (107/78 - 140/80)  RR: 17 (05-29-19 @ 10:38) (15 - 19)  SpO2: 95% (05-29-19 @ 10:38) (93% - 98%)  Wt(kg): --Vital Signs Last 24 Hrs  T(C): 38.9 (29 May 2019 10:03), Max: 39.1 (28 May 2019 20:55)  T(F): 102.1 (29 May 2019 10:03), Max: 102.3 (28 May 2019 20:55)  HR: 86 (29 May 2019 10:38) (68 - 101)  BP: 137/74 (29 May 2019 10:38) (107/78 - 140/80)  BP(mean): --  RR: 17 (29 May 2019 10:38) (15 - 19)  SpO2: 95% (29 May 2019 10:38) (93% - 98%)    PHYSICAL EXAM:  GENERAL: NAD, well-groomed, well-developed  HEAD:  Atraumatic, Normocephalic  EYES: EOMI, PERRLA, conjunctiva and sclera clear  ENMT: No tonsillar erythema, exudates, or enlargement; Moist mucous membranes, Good dentition, No oral ulcers or lesions   NECK: Supple, No JVD, Normal thyroid  NERVOUS SYSTEM:  Alert & Oriented X3, Good concentration; Motor Strength 5/5 B/L upper and lower extremities; DTRs 2+ intact and symmetric, sensation intact. FTN and heel to shin intact. No pronator drift. CN II-XII grossly intact.   CHEST/LUNG: CTAB; No rales, rhonchi, wheezing, or rubs  HEART: irregularly irregular; No murmurs, rubs, or gallops  ABDOMEN: Soft, Nontender, Nondistended; Bowel sounds present, +healed incisions from splenectomy, no hepatomegaly or Babb's sign   EXTREMITIES:  2+ Peripheral Pulses, No clubbing, cyanosis, or edema, no joint swelling  LYMPH: No lymphadenopathy noted  SKIN: No rashes or lesions    Consultant(s) Notes Reviewed:  [x ] YES  [ ] NO  Care Discussed with Consultants/Other Providers [ x] YES  [ ] NO    LABS:                        14.9   4.23  )-----------( 125      ( 29 May 2019 06:26 )             45.6     05-29    142  |  105  |  9   ----------------------------<  93  4.3   |  27  |  0.87    Ca    8.7      29 May 2019 06:26  Mg     1.9     05-29    TPro  6.2  /  Alb  3.5  /  TBili  0.4  /  DBili  <0.2  /  AST  100<H>  /  ALT  99<H>  /  AlkPhos  100  05-29    PT/INR - ( 28 May 2019 16:40 )   PT: 12.2 sec;   INR: 1.10          PTT - ( 28 May 2019 16:40 )  PTT:32.4 sec  Urinalysis Basic - ( 28 May 2019 22:12 )    Color: Yellow / Appearance: Clear / SG: <=1.005 / pH: x  Gluc: x / Ketone: NEGATIVE  / Bili: NEGATIVE / Urobili: 0.2 E.U./dL   Blood: x / Protein: Trace mg/dL / Nitrite: NEGATIVE   Leuk Esterase: NEGATIVE / RBC: < 5 /HPF / WBC < 5 /HPF   Sq Epi: x / Non Sq Epi: x / Bacteria: x      CAPILLARY BLOOD GLUCOSE      POCT Blood Glucose.: 87 mg/dL (28 May 2019 16:28)        Urinalysis Basic - ( 28 May 2019 22:12 )    Color: Yellow / Appearance: Clear / SG: <=1.005 / pH: x  Gluc: x / Ketone: NEGATIVE  / Bili: NEGATIVE / Urobili: 0.2 E.U./dL   Blood: x / Protein: Trace mg/dL / Nitrite: NEGATIVE   Leuk Esterase: NEGATIVE / RBC: < 5 /HPF / WBC < 5 /HPF   Sq Epi: x / Non Sq Epi: x / Bacteria: x        RADIOLOGY & ADDITIONAL TESTS:  US Duplex Venous Lower Ext Complete, Bilateral (05.29.19 @ 12:25)  IMPRESSION:  No deep vein thrombosis seen.      RICO BARRIENTOS M.D., RADIOLOGY RESIDENT  This document has been electronically signed.  FORD LEDESMA M.D., ATTENDING RADIOLOGIST  This document has been electronically signed. May 29 2019  1:39PM    US Abdomen Complete (05.29.19 @ 12:04)   IMPRESSION:  1.  Patient is status post splenectomy. There are multiple round   structures in the left upper quadrant consistent with splenosis, similar   to CT angiogram dated 5/28/2019.    2.  Pancreas not well visualized, appears normal on CT.    "Thank you for the opportunity to participate in the care of this   patient."    RICO BARRIENTOS M.D., RADIOLOGY RESIDENT  This document has been electronically signed.  FORD LEDESMA M.D., ATTENDING RADIOLOGIST  This document has been electronically signed. May 29 2019  1:36PM                Imaging Personally Reviewed:  [ ] YES  [ ] NO CORY MONTENEGRO  63y  Male    Patient is a 63y old  Male who presents with a chief complaint of headache, dizziness and weakness x 3 days. (28 May 2019 23:33)  63 yr old M, POOR HISTORIAN with PMHx of hyperlipidemia, GERD presents to [facility] 5/28/19 c/o headache, dizziness and weakness x 3 days.  "Patient states he has experienced similar symptoms of lesser severity over the past 2 years, worsened over the past 3 days. Patient states he is experiencing dizziness even when lying still and when his eyes are closed. Patient states he can barely walk 25 feet prior to feeling "unsteady". Patient reports he last saw a neurologist 6 days ago and was cleared from acute neuro abnormalities. Pt has also seen an ENT specialist, had nasal scope done and was advised to get a CT scan and MRI.   Patient denies any chest pain, SOB, palpitations, fever/chills, vomiting, diarrhea, abdominal pain, PND, orthopnea, LE edema, recent travel or sick contacts. Patient does report recent URI for which he was prescribed Augmentin, currently on day 8/10.  In [facility], BP: 115/68, HR: 68, RR:16, Temp: 98.1F, O2 sat: 95% RA. EKG revealed Afib@116BPM with no acute ST/T wave changes. Cardiac enzymes negative x 1 set. D-dimer elevated 5062.   CT head revealed no acute abnormality, right parieto-occipital encephalomalacia and gliosis likely sequelae of prior infarct and/or contusion. CT Angio Head and neck were unremarkable. CT Angio Chest negative for PE.   While in triage patient noted to have Tmax of 102.3F, no leukocytosis, lactic acid within normal limits. UA unremarkable. Flu A/B and RSV negative. Utox negative. Blood cultures were drawn.   Patient treated with Ceftriaxone 1g IV x 1 dose, Tylenol 975mg PO x 1 dose, Meclizine 25mg PO x 1 dose, Reglan 10mg IV x 1 dose and was started on IV fluid hydration.   Cardiology Dr. Khan was consulted, limited bedside echo c/w preserved LV function, mild MR and TR, normal LA.  Patient currently stable, transferred to [facility] for cardiac telemetry, medical management of Afib and further work-up."    INTERVAL HPI/OVERNIGHT EVENTS:  63M w/PMH of splenectomy (42 years ago), b/l inguinal hernia repair (2017), ?GERD vs gastritis on EGD 1 year ago p/w to [facility] with weakness, dizziness and headaches for past 4-5 days. Pt reports headache is located in bilateral temples, constant, throbbing, unchanged since it started, worse with neck flexion and extension. He reports dizziness as more of a instability, but cannot describe it further. Pt reports b/l visual changes 4 days ago that he describes as flashing lights, dark vision, "half/half". Also neck tenderness w/movement, but NO pain. He reports that has since resolved. He reports subjective fevers the last few days, but has been compliant with PO abx.   Reports his son and wife had viral illnesses recently.  Denies travel, no animal bites, exposures. Denies IVDU or tattoes. No FH of autoimmune diseases or malignancy  Pt reports that he has been on 4 courses of abx in the last 3 months for presumed bacterial sinus infections. He currently is on Augmentin Day 7/8 as prescribed by PCP, Dr. Wong De Leon ([phone]). He reports seeing ENT and they ruled out sinus infection. He also saw a neurologist 6 days ago and was cleared.   Pt had splenectomy 42 years ago after traumatic soccer injury, and reports getting all necessary vaccinations from his PMD in the last 10 years.     SH:   Reports heavy use of vodka and beer in the past, but quit 1 year ago per wife.  Reports smoking 1PPD for a few years, however quit 30 years ago  Denies ETOH abuse.   , works as [employer]       At [facility], EKG w/ Afib at 116, no ST changes. CE negative x1, D-dimer elevated to 5062. 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 CTA head and neck negative. CTPE negative. Patient transferred to [facility] for cardiac telemetry, medical management of Afib and further work-up.         REVIEW OF SYSTEMS:  CONSTITUTIONAL: +fever, generalized weakness, no weight loss  EYES: No eye pain, visual disturbances, or discharge  ENMT:  No difficulty hearing, tinnitus, vertigo; No sinus or throat pain  NECK: No pain or stiffness  BREASTS: No pain, masses, or nipple discharge  RESPIRATORY: No cough, wheezing, chills or hemoptysis; No shortness of breath  CARDIOVASCULAR: No chest pain, palpitations, dizziness, or leg swelling  GASTROINTESTINAL: No abdominal or epigastric pain. No nausea, vomiting, or hematemesis; No diarrhea or constipation. No melena or hematochezia.  GENITOURINARY: No dysuria, frequency, hematuria, or incontinence  NEUROLOGICAL: +headaches, No memory loss, loss of strength, numbness, or tremors  SKIN: +hx of petechial rash dorsal surface of b/l hands,  No itching, burning, or lesions   LYMPH NODES: +reports submandibular LAD that improved with Abx  ENDOCRINE: No heat or cold intolerance; No hair loss  MUSCULOSKELETAL: No joint pain or swelling; No muscle, back, or extremity pain  PSYCHIATRIC: No depression, anxiety, mood swings, or difficulty sleeping  HEME/LYMPH: No easy bruising, or bleeding gums  ALLERY AND IMMUNOLOGIC: No hives or eczema    T(C): 38.9 (05-29-19 @ 10:03), Max: 39.1 (05-28-19 @ 20:55)  HR: 86 (05-29-19 @ 10:38) (68 - 101)  BP: 137/74 (05-29-19 @ 10:38) (107/78 - 140/80)  RR: 17 (05-29-19 @ 10:38) (15 - 19)  SpO2: 95% (05-29-19 @ 10:38) (93% - 98%)  Wt(kg): --Vital Signs Last 24 Hrs  T(C): 38.9 (29 May 2019 10:03), Max: 39.1 (28 May 2019 20:55)  T(F): 102.1 (29 May 2019 10:03), Max: 102.3 (28 May 2019 20:55)  HR: 86 (29 May 2019 10:38) (68 - 101)  BP: 137/74 (29 May 2019 10:38) (107/78 - 140/80)  BP(mean): --  RR: 17 (29 May 2019 10:38) (15 - 19)  SpO2: 95% (29 May 2019 10:38) (93% - 98%)    PHYSICAL EXAM:  GENERAL: NAD, well-groomed, well-developed  HEAD:  Atraumatic, Normocephalic  EYES: EOMI, PERRLA, conjunctiva and sclera clear  ENMT: No tonsillar erythema, exudates, or enlargement; Moist mucous membranes, Good dentition, No oral ulcers or lesions   NECK: Supple, No JVD, Normal thyroid  NERVOUS SYSTEM:  Alert & Oriented X3, Good concentration; Motor Strength 5/5 B/L upper and lower extremities; DTRs 2+ intact and symmetric, sensation intact. FTN and heel to shin intact. No pronator drift. CN II-XII grossly intact.   CHEST/LUNG: CTAB; No rales, rhonchi, wheezing, or rubs  BACK: peeling of upper back, area that was exposed to sun   HEART: irregularly irregular; No murmurs, rubs, or gallops  ABDOMEN: Soft, Nontender, Nondistended; Bowel sounds present, +healed incisions from splenectomy, no hepatomegaly or Babb's sign   EXTREMITIES:  2+ Peripheral Pulses, No clubbing, cyanosis, or edema, no joint swelling or synovitis in fingers  LYMPH: No lymphadenopathy noted  SKIN: No rashes or lesions    Consultant(s) Notes Reviewed:  [x ] YES  [ ] NO  Care Discussed with Consultants/Other Providers [ x] YES  [ ] NO    LABS:                        14.9   4.23  )-----------( 125      ( 29 May 2019 06:26 )             45.6     05-29    142  |  105  |  9   ----------------------------<  93  4.3   |  27  |  0.87    Ca    8.7      29 May 2019 06:26  Mg     1.9     05-29    TPro  6.2  /  Alb  3.5  /  TBili  0.4  /  DBili  <0.2  /  AST  100<H>  /  ALT  99<H>  /  AlkPhos  100  05-29    PT/INR - ( 28 May 2019 16:40 )   PT: 12.2 sec;   INR: 1.10          PTT - ( 28 May 2019 16:40 )  PTT:32.4 sec  Urinalysis Basic - ( 28 May 2019 22:12 )    Color: Yellow / Appearance: Clear / SG: <=1.005 / pH: x  Gluc: x / Ketone: NEGATIVE  / Bili: NEGATIVE / Urobili: 0.2 E.U./dL   Blood: x / Protein: Trace mg/dL / Nitrite: NEGATIVE   Leuk Esterase: NEGATIVE / RBC: < 5 /HPF / WBC < 5 /HPF   Sq Epi: x / Non Sq Epi: x / Bacteria: x      CAPILLARY BLOOD GLUCOSE      POCT Blood Glucose.: 87 mg/dL (28 May 2019 16:28)        Urinalysis Basic - ( 28 May 2019 22:12 )    Color: Yellow / Appearance: Clear / SG: <=1.005 / pH: x  Gluc: x / Ketone: NEGATIVE  / Bili: NEGATIVE / Urobili: 0.2 E.U./dL   Blood: x / Protein: Trace mg/dL / Nitrite: NEGATIVE   Leuk Esterase: NEGATIVE / RBC: < 5 /HPF / WBC < 5 /HPF   Sq Epi: x / Non Sq Epi: x / Bacteria: x        RADIOLOGY & ADDITIONAL TESTS:  US Duplex Venous Lower Ext Complete, Bilateral (05.29.19 @ 12:25)  IMPRESSION:  No deep vein thrombosis seen.      RICO BARRIENTOS M.D., RADIOLOGY RESIDENT  This document has been electronically signed.  FORD LEDESMA M.D., ATTENDING RADIOLOGIST  This document has been electronically signed. May 29 2019  1:39PM    US Abdomen Complete (05.29.19 @ 12:04)   IMPRESSION:  1.  Patient is status post splenectomy. There are multiple round   structures in the left upper quadrant consistent with splenosis, similar   to CT angiogram dated 5/28/2019.    2.  Pancreas not well visualized, appears normal on CT.    "Thank you for the opportunity to participate in the care of this   patient."    RICO BARRIENTOS M.D., RADIOLOGY RESIDENT  This document has been electronically signed.  FORD LEDESMA M.D., ATTENDING RADIOLOGIST  This document has been electronically signed. May 29 2019  1:36PM                Imaging Personally Reviewed:  [ ] YES  [ ] NO

## 2019-05-29 NOTE — PROGRESS NOTE ADULT - PROBLEM SELECTOR PLAN 1
Currently in NSR  - continue metoprolol 12.5mg BID and atorvastatin 20mg QHS  - CHADS-VASC 2 given old stroke seen on CT head  - trops negative x 3  - follow up echo

## 2019-05-29 NOTE — PROGRESS NOTE ADULT - PROBLEM SELECTOR PLAN 2
- CT head revealed right parieto-occipital encephalomalacia and gliosis likely sequelae of prior infarct and/or contusion. CT Angio Head and neck were unremarkable.  - Dr. Collins consulted, follow up recs  - ENT consulted, follow up recs

## 2019-05-30 DIAGNOSIS — R74.0 NONSPECIFIC ELEVATION OF LEVELS OF TRANSAMINASE AND LACTIC ACID DEHYDROGENASE [LDH]: ICD-10-CM

## 2019-05-30 DIAGNOSIS — D69.6 THROMBOCYTOPENIA, UNSPECIFIED: ICD-10-CM

## 2019-05-30 DIAGNOSIS — R63.8 OTHER SYMPTOMS AND SIGNS CONCERNING FOOD AND FLUID INTAKE: ICD-10-CM

## 2019-05-30 DIAGNOSIS — Z91.89 OTHER SPECIFIED PERSONAL RISK FACTORS, NOT ELSEWHERE CLASSIFIED: ICD-10-CM

## 2019-05-30 LAB
APTT BLD: 133 SEC — CRITICAL HIGH (ref 27.5–36.3)
BASOPHILS # BLD AUTO: 0 K/UL — SIGNIFICANT CHANGE UP (ref 0–0.2)
BASOPHILS NFR BLD AUTO: 0 % — SIGNIFICANT CHANGE UP (ref 0–2)
CMV IGM FLD-ACNC: <8 AU/ML — SIGNIFICANT CHANGE UP
CMV IGM SERPL QL: NEGATIVE — SIGNIFICANT CHANGE UP
EBV EA AB SER IA-ACNC: 32.4 U/ML — HIGH
EBV EA AB TITR SER IF: POSITIVE
EBV EA IGG SER-ACNC: POSITIVE
EBV NA IGG SER IA-ACNC: 489 U/ML — HIGH
EBV PATRN SPEC IB-IMP: SIGNIFICANT CHANGE UP
EBV VCA IGG AVIDITY SER QL IA: POSITIVE
EBV VCA IGM SER IA-ACNC: <10 U/ML — SIGNIFICANT CHANGE UP
EBV VCA IGM SER IA-ACNC: >750 U/ML — HIGH
EBV VCA IGM TITR FLD: NEGATIVE — SIGNIFICANT CHANGE UP
EOSINOPHIL # BLD AUTO: 0 K/UL — SIGNIFICANT CHANGE UP (ref 0–0.5)
EOSINOPHIL NFR BLD AUTO: 0 % — SIGNIFICANT CHANGE UP (ref 0–6)
ERYTHROCYTE [SEDIMENTATION RATE] IN BLOOD: 2 MM/HR — SIGNIFICANT CHANGE UP
FERRITIN SERPL-MCNC: 1835 NG/ML — HIGH (ref 30–400)
HCT VFR BLD CALC: 45 % — SIGNIFICANT CHANGE UP (ref 39–50)
HGB BLD-MCNC: 14.6 G/DL — SIGNIFICANT CHANGE UP (ref 13–17)
HIV 1+2 AB+HIV1 P24 AG SERPL QL IA: SIGNIFICANT CHANGE UP
LDH SERPL L TO P-CCNC: SIGNIFICANT CHANGE UP U/L (ref 50–242)
LYMPHOCYTES # BLD AUTO: 1.84 K/UL — SIGNIFICANT CHANGE UP (ref 1–3.3)
LYMPHOCYTES # BLD AUTO: 28.6 % — SIGNIFICANT CHANGE UP (ref 13–44)
MCHC RBC-ENTMCNC: 29.4 PG — SIGNIFICANT CHANGE UP (ref 27–34)
MCHC RBC-ENTMCNC: 32.4 GM/DL — SIGNIFICANT CHANGE UP (ref 32–36)
MCV RBC AUTO: 90.5 FL — SIGNIFICANT CHANGE UP (ref 80–100)
MONOCYTES # BLD AUTO: 0.46 K/UL — SIGNIFICANT CHANGE UP (ref 0–0.9)
MONOCYTES NFR BLD AUTO: 7.1 % — SIGNIFICANT CHANGE UP (ref 2–14)
NEUTROPHILS # BLD AUTO: 4.15 K/UL — SIGNIFICANT CHANGE UP (ref 1.8–7.4)
NEUTROPHILS NFR BLD AUTO: 42 % — LOW (ref 43–77)
NRBC # BLD: 0 /100 WBCS — SIGNIFICANT CHANGE UP (ref 0–0)
PLATELET # BLD AUTO: 132 K/UL — LOW (ref 150–400)
PROT SERPL-MCNC: 5.8 G/DL — LOW (ref 6–8.3)
PROT SERPL-MCNC: 5.8 G/DL — LOW (ref 6–8.3)
RBC # BLD: 4.97 M/UL — SIGNIFICANT CHANGE UP (ref 4.2–5.8)
RBC # FLD: 14.6 % — HIGH (ref 10.3–14.5)
RHEUMATOID FACT SERPL-ACNC: 11 IU/ML — SIGNIFICANT CHANGE UP (ref 0–13)
T PALLIDUM AB TITR SER: NEGATIVE — SIGNIFICANT CHANGE UP
WBC # BLD: 6.45 K/UL — SIGNIFICANT CHANGE UP (ref 3.8–10.5)
WBC # FLD AUTO: 6.45 K/UL — SIGNIFICANT CHANGE UP (ref 3.8–10.5)

## 2019-05-30 PROCEDURE — 93880 EXTRACRANIAL BILAT STUDY: CPT | Mod: 26

## 2019-05-30 PROCEDURE — 99232 SBSQ HOSP IP/OBS MODERATE 35: CPT

## 2019-05-30 PROCEDURE — 99222 1ST HOSP IP/OBS MODERATE 55: CPT | Mod: GC

## 2019-05-30 PROCEDURE — 99233 SBSQ HOSP IP/OBS HIGH 50: CPT | Mod: GC

## 2019-05-30 RX ORDER — IOHEXOL 300 MG/ML
30 INJECTION, SOLUTION INTRAVENOUS ONCE
Refills: 0 | Status: DISCONTINUED | OUTPATIENT
Start: 2019-05-30 | End: 2019-05-31

## 2019-05-30 RX ORDER — IBUPROFEN 200 MG
400 TABLET ORAL ONCE
Refills: 0 | Status: COMPLETED | OUTPATIENT
Start: 2019-05-30 | End: 2019-05-30

## 2019-05-30 RX ORDER — APIXABAN 2.5 MG/1
5 TABLET, FILM COATED ORAL EVERY 12 HOURS
Refills: 0 | Status: DISCONTINUED | OUTPATIENT
Start: 2019-05-30 | End: 2019-05-31

## 2019-05-30 RX ADMIN — Medication 1 TABLET(S): at 06:46

## 2019-05-30 RX ADMIN — PANTOPRAZOLE SODIUM 40 MILLIGRAM(S): 20 TABLET, DELAYED RELEASE ORAL at 06:46

## 2019-05-30 RX ADMIN — Medication 400 MILLIGRAM(S): at 21:49

## 2019-05-30 RX ADMIN — Medication 81 MILLIGRAM(S): at 10:35

## 2019-05-30 RX ADMIN — Medication 650 MILLIGRAM(S): at 10:35

## 2019-05-30 RX ADMIN — HEPARIN SODIUM 900 UNIT(S)/HR: 5000 INJECTION INTRAVENOUS; SUBCUTANEOUS at 08:33

## 2019-05-30 RX ADMIN — Medication 12.5 MILLIGRAM(S): at 17:28

## 2019-05-30 RX ADMIN — HEPARIN SODIUM 1200 UNIT(S)/HR: 5000 INJECTION INTRAVENOUS; SUBCUTANEOUS at 00:11

## 2019-05-30 RX ADMIN — Medication 650 MILLIGRAM(S): at 17:28

## 2019-05-30 RX ADMIN — Medication 400 MILLIGRAM(S): at 22:20

## 2019-05-30 RX ADMIN — Medication 400 MILLIGRAM(S): at 00:43

## 2019-05-30 RX ADMIN — Medication 1 TABLET(S): at 17:28

## 2019-05-30 RX ADMIN — Medication 1 SPRAY(S): at 06:47

## 2019-05-30 RX ADMIN — Medication 12.5 MILLIGRAM(S): at 06:46

## 2019-05-30 RX ADMIN — Medication 650 MILLIGRAM(S): at 19:32

## 2019-05-30 RX ADMIN — APIXABAN 5 MILLIGRAM(S): 2.5 TABLET, FILM COATED ORAL at 17:28

## 2019-05-30 RX ADMIN — HEPARIN SODIUM 0 UNIT(S)/HR: 5000 INJECTION INTRAVENOUS; SUBCUTANEOUS at 07:33

## 2019-05-30 RX ADMIN — ATORVASTATIN CALCIUM 20 MILLIGRAM(S): 80 TABLET, FILM COATED ORAL at 21:49

## 2019-05-30 RX ADMIN — Medication 650 MILLIGRAM(S): at 11:35

## 2019-05-30 NOTE — PROGRESS NOTE ADULT - PROBLEM SELECTOR PLAN 5
Elevated AST/ALT, almost 1:1 ratio. Patient denies alcohol abuse, hepatitis panel negative. RUQ US with normal gallbladder, biliary ducts and liver. Per PCP, patient had normal synthetic liver function, normal LFTs on recent labs from April.   - monitor CMP

## 2019-05-30 NOTE — PROGRESS NOTE ADULT - PROBLEM SELECTOR PLAN 2
-CT head revealed right parieto-occipital encephalomalacia and gliosis likely sequelae of prior infarct and/or contusion.  -CT Angio Head and neck were unremarkable.  -Dr. Collins consulted,  recs appreciated  -Ordered for MRI Brain and Carotid Dopplers.  F/U results

## 2019-05-30 NOTE — CONSULT NOTE ADULT - SUBJECTIVE AND OBJECTIVE BOX
Patient is a 63y old  Male who presents with a chief complaint of headache, dizziness and weakness x 3 days. (29 May 2019 15:24)        HPI:  63 yr old M, POOR HISTORIAN with PMHx of hyperlipidemia, GERD presents to [facility] 5/28/19 c/o headache, dizziness and weakness x 3 days.  Patient states he has experienced similar symptoms of lesser severity over the past 2 years, worsened over the past 3 days. Patient states he is experiencing dizziness even when lying still and when his eyes are closed. Patient states he can barely walk 25 feet prior to feeling "unsteady". Patient reports he last saw a neurologist 6 days ago and was cleared from acute neuro abnormalities. Pt has also seen an ENT specialist, had nasal scope done and was advised to get a CT scan and MRI.   Patient denies any chest pain, SOB, palpitations, fever/chills, vomiting, diarrhea, abdominal pain, PND, orthopnea, LE edema, recent travel or sick contacts. Patient does report recent URI for which he was prescribed Augmentin, currently on day 8/10.  In [facility], BP: 115/68, HR: 68, RR:16, Temp: 98.1F, O2 sat: 95% RA. EKG revealed Afib@116BPM with no acute ST/T wave changes. Cardiac enzymes negative x 1 set. D-dimer elevated 5062.   CT head revealed no acute abnormality, right parieto-occipital encephalomalacia and gliosis likely sequelae of prior infarct and/or contusion. CT Angio Head and neck were unremarkable. CT Angio Chest negative for PE.     While in triage patient noted to have Tmax of 102.3F, no leukocytosis, lactic acid within normal limits. UA unremarkable. Flu A/B and RSV negative. Utox negative. Blood cultures were drawn.       Patient treated with Ceftriaxone 1g IV x 1 dose, Tylenol 975mg PO x 1 dose, Meclizine 25mg PO x 1 dose, Reglan 10mg IV x 1 dose and was started on IV fluid hydration.     Cardiology Dr. Khan was consulted, limited bedside echo c/w preserved LV function, mild MR and TR, normal LA.    Patient currently stable, transferred to [facility] for cardiac telemetry, medical management of Afib and further work-up. (28 May 2019 23:33)     Mild headaches- no  dizziness or weakness- no tingling in hands or feet      Allergies  No Known Allergies      Health Issues  HEADACHE  Handoff  MEWS Score  No pertinent past medical history  No pertinent past medical history  Atrial fibrillation, unspecified type  Elevated LFTs  Fever of undetermined origin  Hyperlipidemia  Elevated d-dimer  Dizziness  Atrial fibrillation, unspecified type  Abdominal hernia  Abdominal hernia  HEADACHE  Febrile illness, acute        FAMILY HISTORY:      MEDICATIONS  (STANDING):  amoxicillin  875 milliGRAM(s)/clavulanate 1 Tablet(s) Oral every 12 hours  aspirin enteric coated 81 milliGRAM(s) Oral daily  atorvastatin 20 milliGRAM(s) Oral at bedtime  fluticasone propionate 50 MICROgram(s)/spray Nasal Spray 1 Spray(s) Both Nostrils two times a day  heparin  Infusion.  Unit(s)/Hr (15 mL/Hr) IV Continuous <Continuous>  metoprolol tartrate 12.5 milliGRAM(s) Oral two times a day  pantoprazole    Tablet 40 milliGRAM(s) Oral before breakfast    MEDICATIONS  (PRN):  acetaminophen   Tablet .. 650 milliGRAM(s) Oral every 6 hours PRN Temp greater or equal to 38C (100.4F), Mild Pain (1 - 3)  heparin  Injectable 6500 Unit(s) IV Push every 6 hours PRN For aPTT less than 40  heparin  Injectable 3000 Unit(s) IV Push every 6 hours PRN For aPTT between 40 - 57      PAST MEDICAL & SURGICAL HISTORY:  No pertinent past medical history  Abdominal hernia: x2, bilateral, 2017      Labs                          14.6   6.45  )-----------( 132      ( 30 May 2019 07:37 )             45.0     05-29    142  |  105  |  9   ----------------------------<  93  4.3   |  27  |  0.87    Ca    8.7      29 May 2019 06:26  Mg     1.9     05-29    TPro  6.2  /  Alb  3.5  /  TBili  0.4  /  DBili  <0.2  /  AST  100<H>  /  ALT  99<H>  /  AlkPhos  100  05-29      Radiology:    Physical Exam    MENTAL STATUS  -Level of Consciousness- awake    Orientation- person, place time  Language- aphasia/ dysarthria- nl  Memory- recent and remote- intact      Cranial Nerve 1- 12  Pupils- equal and reactive  Eye movements- full EOM  Facial - no asymmetry   Lower CN-nl    Gait and Station- no foot drop    MOTOR  Upper-nl  Lower-nl    Reflexes- intact    Sensation- no sensory level    Cerebellar- no tremors    vascular - no bruits    Assessment- Hx of CVA    Plan MRI head- Doppler carotid and vertebral    Hyper coag w/o

## 2019-05-30 NOTE — PROGRESS NOTE ADULT - PROBLEM SELECTOR PLAN 2
Patient found to be in AFib with HR 110s on arrival to [facility], no known history of AFib, suspect paroxysmal 2/2 fever. Patient started on Lopressor on 5 Uris and converted to NSR. He was initially on heparin drip and transitioned to Eliquis on 5/30. CHADsVASC 0 at this time. Echo without any valvular disease or dysfunction   - continue metoprolol tartrate 12.5mg BID   - continue ASA 81mg qd  - consider d/c Eliquis as CHADsVASC 0

## 2019-05-30 NOTE — CONSULT NOTE ADULT - ASSESSMENT
62yo M with PMH of HLD and GERD, s/p splenectomy, who presented to [facility] 5/28 with two days of headache, dizziness, and weakness, found to be in rapid AFib and admitted to [facility] for further management, s/p rate control and now c/b persistent fevers despite ABX and negative w/u for infection, transferred to medicine for w/u of FUO. ID consulted for further recs.     At this time, etiology of fevers remain unclear. Pt lives [facility] which is endemic to tick-born diseases which may manifest with high fevers and mildly elevated LFTs. In addition, cannot rule out underlying autoimmune d/o vs. immunocompromised state given asplenia. In addition multiple viruses may cause similar presentation. ESR normal which is points against vasculitis though is a part of the differential. Pt with otherwise normal imaging of CTH, CTA h/n, CT chest. URI symptoms improved with ENT low suspicion for such symptoms to be etiology of fevers. Given acute bandemia and neutropenia on admission, more of septic presentation rather than malignancy as reason for fevers.   -would check CTAP w/ contrast  -f/u MICHAEL, ANCA   -f/u EBV, CMV serologies  -would check HSV IgM/IgG  -f/u quantiferon  -repeat blood cx when spikes fever   -ID team 1 to follow 64yo M with PMH of HLD and GERD, s/p splenectomy, who presented to [facility] 5/28 with two days of headache, dizziness, and weakness, found to be in rapid AFib and admitted to [facility] for further management, s/p rate control and now c/b persistent fevers despite ABX and negative w/u for infection, transferred to medicine for w/u of FUO. ID consulted for further recs.     At this time, etiology of fevers remain unclear. [facility] which [facility] endemic to tick-born diseases which may manifest with high fevers and mildly elevated LFTs. In addition, cannot rule out underlying autoimmune d/o vs. immunocompromised state given asplenia. In addition multiple viruses may cause similar presentation. ESR normal which is points against vasculitis though is a part of the differential. Pt with otherwise normal imaging of CTH, CTA h/n, CT chest. URI symptoms improved with ENT low suspicion for such symptoms to be etiology of fevers. Given acute bandemia and neutropenia on admission, more of septic presentation rather than malignancy as reason for fevers.   -would check CTAP w/ contrast  -f/u MICHAEL, ANCA   -f/u EBV, CMV serologies  -f/u quantiferon  -would check HSV IgM/IgG  -would send leptospirosis, chlamydia, and coxiella serologies (as pt later endorsing exposure to farm animal droppings/dander)  -repeat blood cx when spikes fever   -ID team 1 to follow 64yo M with PMH of HLD and GERD, s/p splenectomy, who presented to [facility] 5/28 with two days of headache, dizziness, and weakness, found to be in rapid AFib and admitted to [facility] 5 [facility] for further management, s/p rate control and now c/b persistent fevers despite ABX and negative w/u for infection, transferred to medicine for w/u of FUO. ID consulted for further recs.     At this time, etiology of fevers remain unclear. Pt lives [facility] which is endemic to tick-born diseases which may manifest with high fevers and mildly elevated LFTs. In addition, cannot rule out underlying autoimmune d/o vs. immunocompromised state given asplenia. In addition multiple viruses may cause similar presentation. ESR normal which is points against vasculitis though is a part of the differential. Pt with otherwise normal imaging of CTH, CTA h/n, CT chest. URI symptoms improved with ENT low suspicion for such symptoms to be etiology of fevers. Given acute bandemia and neutropenia on admission, more of septic presentation rather than malignancy as reason for fevers.   -would check CTAP w/ contrast  -f/u MICHAEL, ANCA   -f/u EBV, CMV serologies  -f/u quantiferon  -would check HSV IgM/IgG  -would send leptospirosis, brucella, chlamydia, and coxiella serologies (as pt later endorsing exposure to farm animal droppings/dander)  -repeat blood cx when spikes fever   -ID team 1 to follow

## 2019-05-30 NOTE — PROGRESS NOTE ADULT - SUBJECTIVE AND OBJECTIVE BOX
Interventional Cardiology Transfer Note    Subjective: Pt seen at bedside, NAD, HD stable. He currently denies chest pain, sob, dyspnea on exertion or palpitations.      Neg 12 points ROS                        14.6   6.45  )-----------( 132      ( 30 May 2019 07:37 )             45.0       05-29    142  |  105  |  9   ----------------------------<  93  4.3   |  27  |  0.87    Ca    8.7      29 May 2019 06:26  Mg     1.9     05-29    TPro  6.2  /  Alb  3.5  /  TBili  0.4  /  DBili  <0.2  /  AST  100<H>  /  ALT  99<H>  /  AlkPhos  100  05-29      PT/INR - ( 28 May 2019 16:40 )   PT: 12.2 sec;   INR: 1.10     PTT - ( 30 May 2019 06:40 )  PTT:133.0 sec    CARDIAC MARKERS ( 29 May 2019 06:26 )  x     / <0.01 ng/mL / 122 U/L / x     / <1.0 ng/mL  CARDIAC MARKERS ( 29 May 2019 00:56 )  x     / <0.01 ng/mL / 120 U/L / x     / 1.2 ng/mL  CARDIAC MARKERS ( 28 May 2019 16:40 )  <0.017 ng/mL / x     / x     / x     / x            Urinalysis Basic - ( 28 May 2019 22:12 )    Color: Yellow / Appearance: Clear / SG: <=1.005 / pH: x  Gluc: x / Ketone: NEGATIVE  / Bili: NEGATIVE / Urobili: 0.2 E.U./dL   Blood: x / Protein: Trace mg/dL / Nitrite: NEGATIVE   Leuk Esterase: NEGATIVE / RBC: < 5 /HPF / WBC < 5 /HPF   Sq Epi: x / Non Sq Epi: x / Bacteria: x Interventional Cardiology Transfer Note      HPI:  63 yr old M, POOR HISTORIAN with PMHx of hyperlipidemia, GERD presents to [facility] 5/28/19 c/o headache, dizziness and weakness x 3 days.  Patient states he has experienced similar symptoms of lesser severity over the past 2 years, worsened over the past 3 days. Patient states he is experiencing dizziness even when lying still and when his eyes are closed. Patient states he can barely walk 25 feet prior to feeling "unsteady". Patient reports he last saw a neurologist 6 days ago and was cleared from acute neuro abnormalities. Pt has also seen an ENT specialist, had nasal scope done and was advised to get a CT scan and MRI. Patient denies any chest pain, SOB, palpitations, fever/chills, vomiting, diarrhea, abdominal pain, PND, orthopnea, LE edema, recent travel or sick contacts. Patient does report recent URI for which he was prescribed Augmentin, currently on day 8/10. In [facility], BP: 115/68, HR: 68, RR:16, Temp: 98.1F, O2 sat: 95% RA. EKG revealed Afib@116BPM with no acute ST/T wave changes. Cardiac enzymes negative x 1 set. D-dimer elevated 5062. CT head revealed no acute abnormality, right parieto-occipital encephalomalacia and gliosis likely sequelae of prior infarct and/or contusion. CT Angio Head and neck were unremarkable. CT Angio Chest negative for PE.     While in triage patient noted to have Tmax of 102.3F, no leukocytosis, lactic acid within normal limits. UA unremarkable. Flu A/B and RSV negative. Utox negative. Blood cultures were drawn.  Patient treated with Ceftriaxone 1g IV x 1 dose, Tylenol 975mg PO x 1 dose, Meclizine 25mg PO x 1 dose, Reglan 10mg IV x 1 dose and was started on IV fluid hydration.  Cardiology Dr. Khan was consulted, limited bedside echo c/w preserved LV function, mild MR and TR, normal LA. Patient currently stable, transferred to [facility] for cardiac telemetry, medical management of Afib and further work-up.       Subjective: Pt seen at bedside, NAD, HD stable. He currently denies chest pain, sob, dyspnea on exertion or palpitations.      Neg 12 points ROS                        14.6   6.45  )-----------( 132      ( 30 May 2019 07:37 )             45.0       05-29    142  |  105  |  9   ----------------------------<  93  4.3   |  27  |  0.87    Ca    8.7      29 May 2019 06:26  Mg     1.9     05-29    TPro  6.2  /  Alb  3.5  /  TBili  0.4  /  DBili  <0.2  /  AST  100<H>  /  ALT  99<H>  /  AlkPhos  100  05-29      PT/INR - ( 28 May 2019 16:40 )   PT: 12.2 sec;   INR: 1.10     PTT - ( 30 May 2019 06:40 )  PTT:133.0 sec    CARDIAC MARKERS ( 29 May 2019 06:26 )  x     / <0.01 ng/mL / 122 U/L / x     / <1.0 ng/mL  CARDIAC MARKERS ( 29 May 2019 00:56 )  x     / <0.01 ng/mL / 120 U/L / x     / 1.2 ng/mL  CARDIAC MARKERS ( 28 May 2019 16:40 )  <0.017 ng/mL / x     / x     / x     / x            Urinalysis Basic - ( 28 May 2019 22:12 )    Color: Yellow / Appearance: Clear / SG: <=1.005 / pH: x  Gluc: x / Ketone: NEGATIVE  / Bili: NEGATIVE / Urobili: 0.2 E.U./dL   Blood: x / Protein: Trace mg/dL / Nitrite: NEGATIVE   Leuk Esterase: NEGATIVE / RBC: < 5 /HPF / WBC < 5 /HPF   Sq Epi: x / Non Sq Epi: x / Bacteria: x

## 2019-05-30 NOTE — CONSULT NOTE ADULT - SUBJECTIVE AND OBJECTIVE BOX
ID CONSULT:    HPI:  62yo M with PMH of HLD and GERD, s/p splenectomy, who presented to [facility] 5/28 with two days of headache, dizziness, and weakness, found to be in rapid AFib and admitted to [facility] for further management. Patient was started on Lopressor and heparin gtt and converted to NSR. He was transitioned from heparin to Eliquis. On admission, patient also noted to be febrile to 102, meeting sepsis criteria with elevated temp, HR, and 30% bands. Labs significant for elevated AST, ALT, and GGT, as well as elevated ferritin and CRP with normal ESR. Source unknown, CXR clear, UA negative. Medicine was consulted for fever. Of note, per patient and PCP, he has intermittently been on antibiotics for presumed sinus infection since 01/2019, most recently prescribed Augmentin for URI, currently on day 10. Patient has remained in NSR and is stable from cardiology standpoint, being transferred to medicine for further workup of fever of unknown origin. ID consulted for FUO.    Pt seen and examined at bedside. NAD. No respiratory distress.  He endorses prior h/o URI symptoms s/p multiple ABX regimens, now with improved symptoms from prior sore throat, sinus congestion. He denies recent travel, animal exposure, h/o tick bites/exposure, hiking, parasitic infections, prior hx malignancy or autoimmune d/o. Reports he had his spleen taken out for unknown reasons, possibly 2/2 soccer trauma. Denies cough, sputum, CP, palpitations, abdominal pain, n/v/d/c, urinary symptoms. Works as . He is previously from [facility]. After spleen removal, pt got a "shot" for prophylaxis. Denies other S&S acute infection. No new rashes or lesions with his high fevers. No allergies or reactions to dander or other antigens.       PAST MEDICAL & SURGICAL HISTORY:  No pertinent past medical history  Abdominal hernia: x2, bilateral, 2017        REVIEW OF SYSTEMS:  Negative except for above.       MEDICATIONS  (STANDING):  amoxicillin  875 milliGRAM(s)/clavulanate 1 Tablet(s) Oral every 12 hours  apixaban 5 milliGRAM(s) Oral every 12 hours  aspirin enteric coated 81 milliGRAM(s) Oral daily  atorvastatin 20 milliGRAM(s) Oral at bedtime  fluticasone propionate 50 MICROgram(s)/spray Nasal Spray 1 Spray(s) Both Nostrils two times a day  iohexol 300 mG (iodine)/mL Oral Solution 30 milliLiter(s) Oral once  metoprolol tartrate 12.5 milliGRAM(s) Oral two times a day  pantoprazole    Tablet 40 milliGRAM(s) Oral before breakfast    MEDICATIONS  (PRN):  acetaminophen   Tablet .. 650 milliGRAM(s) Oral every 6 hours PRN Temp greater or equal to 38C (100.4F), Mild Pain (1 - 3)      Allergies    No Known Allergies    Intolerances        Vital Signs Last 24 Hrs  T(C): 36.2 (30 May 2019 14:38), Max: 38 (29 May 2019 19:30)  T(F): 97.1 (30 May 2019 14:38), Max: 100.4 (29 May 2019 19:30)  HR: 81 (30 May 2019 13:14) (71 - 81)  BP: 113/72 (30 May 2019 13:14) (113/72 - 124/79)  BP(mean): --  RR: 16 (30 May 2019 13:14) (16 - 16)  SpO2: 96% (30 May 2019 13:14) (95% - 96%)    PHYSICAL EXAM:  Constitutional: NAD. Well-developed, well nourished  HEENT: Conjunctiva clear, no oral lesion, no sinus tenderness on percussion	  Neck: no JVD, no lymphadenopathy, supple  Respiratory: bibasilar fine crackles to mid lung fields  Cardiovascular: RRR with no murmurs  Gastrointestinal:soft, (+) BS, no HSM, nondistened, nonrigid.  Extremities: LE WWP b/l. No LE edema b/l.   Vascular: 2+ DP pulses b/l    LABS                        14.6   6.45  )-----------( 132      ( 30 May 2019 07:37 )             45.0     05-29    142  |  105  |  9   ----------------------------<  93  4.3   |  27  |  0.87    Ca    8.7      29 May 2019 06:26  Mg     1.9     05-29    TPro  6.2  /  Alb  3.5  /  TBili  0.4  /  DBili  <0.2  /  AST  100<H>  /  ALT  99<H>  /  AlkPhos  100  05-29    PTT - ( 30 May 2019 06:40 )  PTT:133.0 sec  Urinalysis Basic - ( 28 May 2019 22:12 )    Color: Yellow / Appearance: Clear / SG: <=1.005 / pH: x  Gluc: x / Ketone: NEGATIVE  / Bili: NEGATIVE / Urobili: 0.2 E.U./dL   Blood: x / Protein: Trace mg/dL / Nitrite: NEGATIVE   Leuk Esterase: NEGATIVE / RBC: < 5 /HPF / WBC < 5 /HPF   Sq Epi: x / Non Sq Epi: x / Bacteria: x        MICROBIOLOGY:    RADIOLOGY & ADDITIONAL STUDIES: ID CONSULT:    HPI:  62yo M with PMH of HLD and GERD, s/p splenectomy, who presented to [facility] 5/28 with two days of headache, dizziness, and weakness, found to be in rapid AFib and admitted to [facility] for further management. Patient was started on Lopressor and heparin gtt and converted to NSR. He was transitioned from heparin to Eliquis. On admission, patient also noted to be febrile to 102, meeting sepsis criteria with elevated temp, HR, and 30% bands. Labs significant for elevated AST, ALT, and GGT, as well as elevated ferritin and CRP with normal ESR. Source unknown, CXR clear, UA negative. Medicine was consulted for fever. Of note, per patient and PCP, he has intermittently been on antibiotics for presumed sinus infection since 01/2019, most recently prescribed Augmentin for URI, currently on day 10. Patient has remained in NSR and is stable from cardiology standpoint, being transferred to medicine for further workup of fever of unknown origin. ID consulted for FUO.    Pt seen and examined at bedside. NAD. No respiratory distress.  He endorses prior h/o URI symptoms s/p multiple ABX regimens, now with improved symptoms from prior sore throat, sinus congestion. He denies recent travel, animal exposure, h/o tick bites/exposure, hiking, parasitic infections, prior hx malignancy or autoimmune d/o. Reports he had his spleen taken out for unknown reasons, possibly 2/2 soccer trauma. Denies cough, sputum, CP, palpitations, abdominal pain, n/v/d/c, urinary symptoms. Works as . He is previously from [facility]. After spleen removal, pt got a "shot" for prophylaxis. Denies other S&S acute infection. No new rashes or lesions with his high fevers. No allergies or reactions to dander or other antigens.       PAST MEDICAL & SURGICAL HISTORY:  No pertinent past medical history  Abdominal hernia: x2, bilateral, 2017        REVIEW OF SYSTEMS:  Negative except for above.       MEDICATIONS  (STANDING):  amoxicillin  875 milliGRAM(s)/clavulanate 1 Tablet(s) Oral every 12 hours  apixaban 5 milliGRAM(s) Oral every 12 hours  aspirin enteric coated 81 milliGRAM(s) Oral daily  atorvastatin 20 milliGRAM(s) Oral at bedtime  fluticasone propionate 50 MICROgram(s)/spray Nasal Spray 1 Spray(s) Both Nostrils two times a day  iohexol 300 mG (iodine)/mL Oral Solution 30 milliLiter(s) Oral once  metoprolol tartrate 12.5 milliGRAM(s) Oral two times a day  pantoprazole    Tablet 40 milliGRAM(s) Oral before breakfast    MEDICATIONS  (PRN):  acetaminophen   Tablet .. 650 milliGRAM(s) Oral every 6 hours PRN Temp greater or equal to 38C (100.4F), Mild Pain (1 - 3)      Allergies    No Known Allergies    Intolerances    SHx:  He is from [facility], is in  [place] 26 years. Lives in [place] with wife and 2 sons.  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  Smoked for ~2-4 years, d/samia many years ago.    Vital Signs Last 24 Hrs  T(C): 36.2 (30 May 2019 14:38), Max: 38 (29 May 2019 19:30)  T(F): 97.1 (30 May 2019 14:38), Max: 100.4 (29 May 2019 19:30)  HR: 81 (30 May 2019 13:14) (71 - 81)  BP: 113/72 (30 May 2019 13:14) (113/72 - 124/79)  BP(mean): --  RR: 16 (30 May 2019 13:14) (16 - 16)  SpO2: 96% (30 May 2019 13:14) (95% - 96%)    PHYSICAL EXAM:  Constitutional: NAD. Well-developed, well nourished  HEENT: Conjunctiva clear, no oral lesion, no sinus tenderness on percussion	  Neck: no JVD, no lymphadenopathy, supple  Respiratory: bibasilar fine crackles to mid lung fields  Cardiovascular: RRR with no murmurs  Gastrointestinal:soft, (+) BS, no HSM, nondistened, nonrigid.  Extremities: LE WWP b/l. No LE edema b/l.   Vascular: 2+ DP pulses b/l    LABS                        14.6   6.45  )-----------( 132      ( 30 May 2019 07:37 )             45.0     05-29    142  |  105  |  9   ----------------------------<  93  4.3   |  27  |  0.87    Ca    8.7      29 May 2019 06:26  Mg     1.9     05-29    TPro  6.2  /  Alb  3.5  /  TBili  0.4  /  DBili  <0.2  /  AST  100<H>  /  ALT  99<H>  /  AlkPhos  100  05-29    PTT - ( 30 May 2019 06:40 )  PTT:133.0 sec  Urinalysis Basic - ( 28 May 2019 22:12 )    Color: Yellow / Appearance: Clear / SG: <=1.005 / pH: x  Gluc: x / Ketone: NEGATIVE  / Bili: NEGATIVE / Urobili: 0.2 E.U./dL   Blood: x / Protein: Trace mg/dL / Nitrite: NEGATIVE   Leuk Esterase: NEGATIVE / RBC: < 5 /HPF / WBC < 5 /HPF   Sq Epi: x / Non Sq Epi: x / Bacteria: x        MICROBIOLOGY:    RADIOLOGY & ADDITIONAL STUDIES:

## 2019-05-30 NOTE — PROGRESS NOTE ADULT - PROBLEM SELECTOR PLAN 1
Patient febrile to 102 on admission, with 3/4 SIRS criteria (temp, HR, bands), but with no known source. Patient denies any known fevers at home, but as he reports several weeks of being prescribed antibiotics for swollen LNs, sinus infections, and URI, this is concerning for prolonged fever and FUO. Workup done on this admission includes normal CT chest, negative UA, negative RVP, negative HIV, negative RF. CRP and ferritin elevated, but ESR normal. No evidence of acute sinus infection per ENT. Of note, per PCP, patient had negative Lyme recently. Differential includes occult infection, malignancy, or autoimmune process such as vasculitis.   - f/u blood cultures, no growth to date   - f/u tick-borne illness panel, RMSF   - f/u quantiferon, CPK, MICHAEL, SPEP, ANCA, EBV, CMV, HSV serologies   - CT A/P per ID recs  - ID consulted, f/u recs Patient febrile to 102 on admission, with 3/4 SIRS criteria (temp, HR, bands), but with no known source. Patient denies any known fevers at home, but as he reports several weeks of being prescribed antibiotics for swollen LNs, sinus infections, and URI, this is concerning for prolonged fever and FUO. Workup done on this admission includes normal CT chest, negative UA, negative RVP, negative HIV, negative RF. CRP and ferritin elevated, but ESR normal. No evidence of acute sinus infection per ENT. Of note, per PCP, patient had negative Lyme recently. Patient has PSH of splenectomy, but reports receiving all necessary vaccinations. Differential includes occult infection, malignancy, or autoimmune process such as vasculitis.   - f/u blood cultures, no growth to date   - f/u tick-borne illness panel, RMSF   - f/u quantiferon, CPK, MICHAEL, SPEP, ANCA, EBV, CMV, HSV serologies   - CT A/P per ID recs  - ID consulted, f/u recs

## 2019-05-30 NOTE — PROGRESS NOTE ADULT - PROBLEM SELECTOR PLAN 3
Tmax 102.3F@[facility], today Tmx 99.7F  today  - CRP 9.6, d-dimer 5062  - WBC normal, however, with bandemia 30.1%.. lactic acid within normal limits. UA   unremarkable. Flu A/B and RSV negative. Utox negative.  - follow up blood cultures sent in [facility]  - RVP negative  - Patient given Ceftriaxone 1g IV x 1 dose and Tylenol 975mg PO x 1 dose @[facility]. Patient admits to recent URI for which he has been on Augmentin BID as outpatient, currently on day 10/10. (last dose 5/30/19 PM). Will continue for now.  - Medicine consulted,  pt now transferred to service for further FUO work-up    -ENT evaluated and felt5 there is no etiology for fevers, would rec continue following up with outside ENT.  -Please provide them with the CT temporal bone on d/c, audiogram as outpatient, MRI IAC as outpatient (following audio) as previous ENT had ordered.    -ID consulted today.  F/U recs.

## 2019-05-30 NOTE — PROGRESS NOTE ADULT - ASSESSMENT
64yo M, POOR HISTORIAN, born in [facility] with PMHx of hyperlipidemia, GERD presents to [facility] 5/28/19 c/o headache, dizziness and weakness x 3 days. He was found to be febrile, in new afib and was transferred to [facility] for further management. Pt started on AC and rate controlling medication and is cleared from cardiac standpoint for transfer to medicine for additional FUO work-up 62yo M, POOR HISTORIAN, born in [facility] with PMHx of hyperlipidemia, GERD presents to [facility] 5/28/19 c/o headache, dizziness and weakness x 3 days. He was found to be febrile, in new afib and was transferred to [facility] for further management. Pt started on Eliquis 5mg q12h for AC and Metoprolol Tartrate 12.5mg BID for rate controlling medication and is cleared from cardiac standpoint for transfer to medicine for additional FUO work-up

## 2019-05-30 NOTE — PROGRESS NOTE ADULT - SUBJECTIVE AND OBJECTIVE BOX
INTERNAL MEDICINE PROGRESS NOTE     INTERVAL HPI/OVERNIGHT EVENTS: Tmax 102 yesterday afternoon. Low grade temp 100.4 in the evening.     VITAL SIGNS:  T(F): 97.9 (05-30-19 @ 06:21)  HR: 80 (05-30-19 @ 08:50)  BP: 116/68 (05-30-19 @ 08:50)  RR: 16 (05-30-19 @ 08:50)  SpO2: 96% (05-30-19 @ 08:50)  Wt(kg): --    PHYSICAL EXAM:    Constitutional:  Eyes:  ENMT:  Neck:  Respiratory:  Cardiovascular:  Gastrointestinal:  Extremities:  Vascular:  Neurological:  Musculoskeletal:    MEDICATIONS  (STANDING):  amoxicillin  875 milliGRAM(s)/clavulanate 1 Tablet(s) Oral every 12 hours  aspirin enteric coated 81 milliGRAM(s) Oral daily  atorvastatin 20 milliGRAM(s) Oral at bedtime  fluticasone propionate 50 MICROgram(s)/spray Nasal Spray 1 Spray(s) Both Nostrils two times a day  heparin  Infusion.  Unit(s)/Hr (15 mL/Hr) IV Continuous <Continuous>  metoprolol tartrate 12.5 milliGRAM(s) Oral two times a day  pantoprazole    Tablet 40 milliGRAM(s) Oral before breakfast    MEDICATIONS  (PRN):  acetaminophen   Tablet .. 650 milliGRAM(s) Oral every 6 hours PRN Temp greater or equal to 38C (100.4F), Mild Pain (1 - 3)  heparin  Injectable 6500 Unit(s) IV Push every 6 hours PRN For aPTT less than 40  heparin  Injectable 3000 Unit(s) IV Push every 6 hours PRN For aPTT between 40 - 57      Allergies    No Known Allergies    Intolerances        LABS:                        14.6   6.45  )-----------( 132      ( 30 May 2019 07:37 )             45.0     05-29    142  |  105  |  9   ----------------------------<  93  4.3   |  27  |  0.87    Ca    8.7      29 May 2019 06:26  Mg     1.9     05-29    TPro  6.2  /  Alb  3.5  /  TBili  0.4  /  DBili  <0.2  /  AST  100<H>  /  ALT  99<H>  /  AlkPhos  100  05-29    PT/INR - ( 28 May 2019 16:40 )   PT: 12.2 sec;   INR: 1.10          PTT - ( 30 May 2019 06:40 )  PTT:133.0 sec  Urinalysis Basic - ( 28 May 2019 22:12 )    Color: Yellow / Appearance: Clear / SG: <=1.005 / pH: x  Gluc: x / Ketone: NEGATIVE  / Bili: NEGATIVE / Urobili: 0.2 E.U./dL   Blood: x / Protein: Trace mg/dL / Nitrite: NEGATIVE   Leuk Esterase: NEGATIVE / RBC: < 5 /HPF / WBC < 5 /HPF   Sq Epi: x / Non Sq Epi: x / Bacteria: x        RADIOLOGY & ADDITIONAL TESTS: INCOMPLETE  INTERNAL MEDICINE PROGRESS NOTE     INTERVAL HPI/OVERNIGHT EVENTS: Tmax 102 yesterday afternoon. Low grade temp 100.4 in the evening.     VITAL SIGNS:  T(F): 97.9 (05-30-19 @ 06:21)  HR: 80 (05-30-19 @ 08:50)  BP: 116/68 (05-30-19 @ 08:50)  RR: 16 (05-30-19 @ 08:50)  SpO2: 96% (05-30-19 @ 08:50)  Wt(kg): --    PHYSICAL EXAM:    Constitutional:  Eyes:  ENMT:  Neck:  Respiratory:  Cardiovascular:  Gastrointestinal:  Extremities:  Vascular:  Neurological:  Musculoskeletal:    MEDICATIONS  (STANDING):  amoxicillin  875 milliGRAM(s)/clavulanate 1 Tablet(s) Oral every 12 hours  aspirin enteric coated 81 milliGRAM(s) Oral daily  atorvastatin 20 milliGRAM(s) Oral at bedtime  fluticasone propionate 50 MICROgram(s)/spray Nasal Spray 1 Spray(s) Both Nostrils two times a day  heparin  Infusion.  Unit(s)/Hr (15 mL/Hr) IV Continuous <Continuous>  metoprolol tartrate 12.5 milliGRAM(s) Oral two times a day  pantoprazole    Tablet 40 milliGRAM(s) Oral before breakfast    MEDICATIONS  (PRN):  acetaminophen   Tablet .. 650 milliGRAM(s) Oral every 6 hours PRN Temp greater or equal to 38C (100.4F), Mild Pain (1 - 3)  heparin  Injectable 6500 Unit(s) IV Push every 6 hours PRN For aPTT less than 40  heparin  Injectable 3000 Unit(s) IV Push every 6 hours PRN For aPTT between 40 - 57      Allergies    No Known Allergies    Intolerances        LABS:                        14.6   6.45  )-----------( 132      ( 30 May 2019 07:37 )             45.0     05-29    142  |  105  |  9   ----------------------------<  93  4.3   |  27  |  0.87    Ca    8.7      29 May 2019 06:26  Mg     1.9     05-29    TPro  6.2  /  Alb  3.5  /  TBili  0.4  /  DBili  <0.2  /  AST  100<H>  /  ALT  99<H>  /  AlkPhos  100  05-29    PT/INR - ( 28 May 2019 16:40 )   PT: 12.2 sec;   INR: 1.10          PTT - ( 30 May 2019 06:40 )  PTT:133.0 sec  Urinalysis Basic - ( 28 May 2019 22:12 )    Color: Yellow / Appearance: Clear / SG: <=1.005 / pH: x  Gluc: x / Ketone: NEGATIVE  / Bili: NEGATIVE / Urobili: 0.2 E.U./dL   Blood: x / Protein: Trace mg/dL / Nitrite: NEGATIVE   Leuk Esterase: NEGATIVE / RBC: < 5 /HPF / WBC < 5 /HPF   Sq Epi: x / Non Sq Epi: x / Bacteria: x    Culture - Blood (05.29.19 @ 02:51)    Specimen Source: .Blood Blood    Culture Results:   No growth to date.    Culture - Blood (05.29.19 @ 02:51)    Specimen Source: .Blood Blood    Culture Results:   No growth to date.    HIV-1/2 Antigen/Antibody Screen by [facility] (05.30.19 @ 07:37)    HIV-1/2 Combo Result: Nonreact: The HIV Ag/Ab Combo test performed screens for HIV-1 p24 antigen,  antibodies to HIV-1 (group M and group O), and antibodies to HIV-2. All  specimens repeatedly reactive will reflex to an HIV 1/2 antibody  confirmation and differentiation test. This assay detects p24 antigen  which may be present prior to the development of HIV antibodies,  therefore a reactive result with a negative HIV 1/2 AB Confirmation  should be followed up with HIV-1 RNA, HIV-2 RNA and repeat testing in 4-8  weeks. A nonreactive result does not preclude previous exposure to or  infection with HIV-1 or HIV-2. [facility] prohibits disclosure of this  result to any unauthorized party.    Ferritin, Serum in AM (05.30.19 @ 07:37)    Ferritin, Serum: 1835 ng/mL    Sedimentation Rate, Erythrocyte (05.30.19 @ 07:37)    Sedimentation Rate, Erythrocyte: 2 mm/Hr    C-Reactive Protein, Serum (05.28.19 @ 16:40)    C-Reactive Protein, Serum: 9.6 mg/dL    Acute Hepatitis Panel (05.29.19 @ 17:01)    Hepatitis C Virus Interpretation: Nonreact: Hepatitis C AB  S/CO Ratio                        Interpretation  < 1.0                                     Non-Reactive  1.0 - 4.9                           Weakly-Reactive  > 5.0                                 Reactive  Non-Reactive: A person witha non-reactive HCV antibody result is  considered uninfected.  No further action is needed unless recent  infection is suspected.  In these cases, consider repeat testing later to  detect seroconversion..  Weakly-Reactive: HCV antibody test is abnormal, HCV RNA Qualitative test  will follow.  Reactive: HCV antibody test is abnormal, HCV RNA Qualitative test will  follow.  Note: HCV antibody testing is performed on the Abbott  system.    Hepatitis C Virus S/CO Ratio: 0.11 S/CO    Hepatitis B Core IgM Antibody: Nonreact    Hepatitis B Surface Antigen: Nonreact    Hepatitis A IgM Antibody: NONREACT    Rapid Respiratory Viral Panel (05.29.19 @ 17:46)    Rapid RVP Result: NotDetec: This Respiratory Panel uses polymerase chain reaction (PCR) to detect for  adenovirus; coronavirus (HKU1, NL63, 229E, OC43); human metapneumovirus  (hMPV); human enterovirus/rhinovirus (Entero/RV); influenza A; influenza  A/H1; influenza A/H3; influenza A/H1-2009; influenza B; parainfluenza  viruses 1, 2, 3, 4; respiratory syncytial virus; Mycoplasma pneumoniae;  and Chlamydophila pneumoniae.      RADIOLOGY & ADDITIONAL TESTS:  US Duplex Venous Lower Ext Complete, Bilateral (05.29.19 @ 12:25)  IMPRESSION:  No deep vein thrombosis seen.    RICO BARRIENTOS M.D., RADIOLOGY RESIDENT  This document has been electronically signed.  FORD LEDESMA M.D., ATTENDING RADIOLOGIST  This document has been electronically signed. May 29 2019  1:39PM      US Abdomen Complete (05.29.19 @ 12:04)   IMPRESSION:  1.  Patient is status post splenectomy. There are multiple round   structures in the left upper quadrant consistent with splenosis, similar   to CT angiogram dated 5/28/2019.    2.  Pancreas not well visualized, appears normal on CT.    "Thank you for the opportunity to participate in the care of this   patient."    RICO BARRIENTOS M.D., RADIOLOGY RESIDENT  This document has been electronically signed.  FORD LEDESMA M.D., ATTENDING RADIOLOGIST  This document has been electronically signed. May 29 2019  1:36PM INCOMPLETE  INTERNAL MEDICINE PROGRESS NOTE     INTERVAL HPI/OVERNIGHT EVENTS: Tmax 102 yesterday afternoon. Low grade temp 100.4 in the evening.     VITAL SIGNS:  T(F): 97.9 (05-30-19 @ 06:21)  HR: 80 (05-30-19 @ 08:50)  BP: 116/68 (05-30-19 @ 08:50)  RR: 16 (05-30-19 @ 08:50)  SpO2: 96% (05-30-19 @ 08:50)  Wt(kg): --    PHYSICAL EXAM:    Constitutional: appears well, laying in bed comfortably   Eyes:   ENMT:  Neck:  Respiratory:  Cardiovascular:  Gastrointestinal:  Extremities:  Vascular:  Neurological:  Musculoskeletal:    MEDICATIONS  (STANDING):  amoxicillin  875 milliGRAM(s)/clavulanate 1 Tablet(s) Oral every 12 hours  aspirin enteric coated 81 milliGRAM(s) Oral daily  atorvastatin 20 milliGRAM(s) Oral at bedtime  fluticasone propionate 50 MICROgram(s)/spray Nasal Spray 1 Spray(s) Both Nostrils two times a day  heparin  Infusion.  Unit(s)/Hr (15 mL/Hr) IV Continuous <Continuous>  metoprolol tartrate 12.5 milliGRAM(s) Oral two times a day  pantoprazole    Tablet 40 milliGRAM(s) Oral before breakfast    MEDICATIONS  (PRN):  acetaminophen   Tablet .. 650 milliGRAM(s) Oral every 6 hours PRN Temp greater or equal to 38C (100.4F), Mild Pain (1 - 3)  heparin  Injectable 6500 Unit(s) IV Push every 6 hours PRN For aPTT less than 40  heparin  Injectable 3000 Unit(s) IV Push every 6 hours PRN For aPTT between 40 - 57      Allergies    No Known Allergies    Intolerances        LABS:                        14.6   6.45  )-----------( 132      ( 30 May 2019 07:37 )             45.0     05-29    142  |  105  |  9   ----------------------------<  93  4.3   |  27  |  0.87    Ca    8.7      29 May 2019 06:26  Mg     1.9     05-29    TPro  6.2  /  Alb  3.5  /  TBili  0.4  /  DBili  <0.2  /  AST  100<H>  /  ALT  99<H>  /  AlkPhos  100  05-29    PT/INR - ( 28 May 2019 16:40 )   PT: 12.2 sec;   INR: 1.10          PTT - ( 30 May 2019 06:40 )  PTT:133.0 sec  Urinalysis Basic - ( 28 May 2019 22:12 )    Color: Yellow / Appearance: Clear / SG: <=1.005 / pH: x  Gluc: x / Ketone: NEGATIVE  / Bili: NEGATIVE / Urobili: 0.2 E.U./dL   Blood: x / Protein: Trace mg/dL / Nitrite: NEGATIVE   Leuk Esterase: NEGATIVE / RBC: < 5 /HPF / WBC < 5 /HPF   Sq Epi: x / Non Sq Epi: x / Bacteria: x    Culture - Blood (05.29.19 @ 02:51)    Specimen Source: .Blood Blood    Culture Results:   No growth to date.    Culture - Blood (05.29.19 @ 02:51)    Specimen Source: .Blood Blood    Culture Results:   No growth to date.    HIV-1/2 Antigen/Antibody Screen by [facility] (05.30.19 @ 07:37)    HIV-1/2 Combo Result: Nonreact: The HIV Ag/Ab Combo test performed screens for HIV-1 p24 antigen,  antibodies to HIV-1 (group M and group O), and antibodies to HIV-2. All  specimens repeatedly reactive will reflex to an HIV 1/2 antibody  confirmation and differentiation test. This assay detects p24 antigen  which may be present prior to the development of HIV antibodies,  therefore a reactive result with a negative HIV 1/2 AB Confirmation  should be followed up with HIV-1 RNA, HIV-2 RNA and repeat testing in 4-8  weeks. A nonreactive result does not preclude previous exposure to or  infection with HIV-1 or HIV-2. [facility] prohibits disclosure of this  result to any unauthorized party.    Ferritin, Serum in AM (05.30.19 @ 07:37)    Ferritin, Serum: 1835 ng/mL    Sedimentation Rate, Erythrocyte (05.30.19 @ 07:37)    Sedimentation Rate, Erythrocyte: 2 mm/Hr    C-Reactive Protein, Serum (05.28.19 @ 16:40)    C-Reactive Protein, Serum: 9.6 mg/dL    Acute Hepatitis Panel (05.29.19 @ 17:01)    Hepatitis C Virus Interpretation: Nonreact: Hepatitis C AB  S/CO Ratio                        Interpretation  < 1.0                                     Non-Reactive  1.0 - 4.9                           Weakly-Reactive  > 5.0                                 Reactive  Non-Reactive: A person witha non-reactive HCV antibody result is  considered uninfected.  No further action is needed unless recent  infection is suspected.  In these cases, consider repeat testing later to  detect seroconversion..  Weakly-Reactive: HCV antibody test is abnormal, HCV RNA Qualitative test  will follow.  Reactive: HCV antibody test is abnormal, HCV RNA Qualitative test will  follow.  Note: HCV antibody testing is performed on the Abbott  system.    Hepatitis C Virus S/CO Ratio: 0.11 S/CO    Hepatitis B Core IgM Antibody: Nonreact    Hepatitis B Surface Antigen: Nonreact    Hepatitis A IgM Antibody: NONREACT    Rapid Respiratory Viral Panel (05.29.19 @ 17:46)    Rapid RVP Result: NotDetec: This Respiratory Panel uses polymerase chain reaction (PCR) to detect for  adenovirus; coronavirus (HKU1, NL63, 229E, OC43); human metapneumovirus  (hMPV); human enterovirus/rhinovirus (Entero/RV); influenza A; influenza  A/H1; influenza A/H3; influenza A/H1-2009; influenza B; parainfluenza  viruses 1, 2, 3, 4; respiratory syncytial virus; Mycoplasma pneumoniae;  and Chlamydophila pneumoniae.      RADIOLOGY & ADDITIONAL TESTS:  US Duplex Venous Lower Ext Complete, Bilateral (05.29.19 @ 12:25)  IMPRESSION:  No deep vein thrombosis seen.    RICO BARRIENTOS M.D., RADIOLOGY RESIDENT  This document has been electronically signed.  FORD LEDESMA M.D., ATTENDING RADIOLOGIST  This document has been electronically signed. May 29 2019  1:39PM      US Abdomen Complete (05.29.19 @ 12:04)   IMPRESSION:  1.  Patient is status post splenectomy. There are multiple round   structures in the left upper quadrant consistent with splenosis, similar   to CT angiogram dated 5/28/2019.    2.  Pancreas not well visualized, appears normal on CT.    "Thank you for the opportunity to participate in the care of this   patient."    RICO BARRIENTOS M.D., RADIOLOGY RESIDENT  This document has been electronically signed.  FORD LEDESMA M.D., ATTENDING RADIOLOGIST  This document has been electronically signed. May 29 2019  1:36PM INTERNAL MEDICINE PROGRESS NOTE     INTERVAL HPI/OVERNIGHT EVENTS: Tmax 102 yesterday afternoon. Low grade temp 100.4 in the evening.     VITAL SIGNS:  T(F): 97.9 (05-30-19 @ 06:21)  HR: 80 (05-30-19 @ 08:50)  BP: 116/68 (05-30-19 @ 08:50)  RR: 16 (05-30-19 @ 08:50)  SpO2: 96% (05-30-19 @ 08:50)  Wt(kg): --    PHYSICAL EXAM:    Constitutional: appears well, laying in bed comfortably   Eyes: EOMI, PERLL, no visual field defects  ENMT: MMM, no oral lesions/ulcers   Neck: supple  Respiratory: CTAB, no rales, rhonchi, wheezing  Cardiovascular: S1/S2, no m/r/g  Gastrointestinal: soft, NT/ND, no hepatomegaly, s/p splenectomy w/residual scarring of abdominal wall  Extremities: wwp, no joint swelling      MEDICATIONS  (STANDING):  amoxicillin  875 milliGRAM(s)/clavulanate 1 Tablet(s) Oral every 12 hours  aspirin enteric coated 81 milliGRAM(s) Oral daily  atorvastatin 20 milliGRAM(s) Oral at bedtime  fluticasone propionate 50 MICROgram(s)/spray Nasal Spray 1 Spray(s) Both Nostrils two times a day  heparin  Infusion.  Unit(s)/Hr (15 mL/Hr) IV Continuous <Continuous>  metoprolol tartrate 12.5 milliGRAM(s) Oral two times a day  pantoprazole    Tablet 40 milliGRAM(s) Oral before breakfast    MEDICATIONS  (PRN):  acetaminophen   Tablet .. 650 milliGRAM(s) Oral every 6 hours PRN Temp greater or equal to 38C (100.4F), Mild Pain (1 - 3)  heparin  Injectable 6500 Unit(s) IV Push every 6 hours PRN For aPTT less than 40  heparin  Injectable 3000 Unit(s) IV Push every 6 hours PRN For aPTT between 40 - 57      Allergies    No Known Allergies    Intolerances        LABS:                        14.6   6.45  )-----------( 132      ( 30 May 2019 07:37 )             45.0     05-29    142  |  105  |  9   ----------------------------<  93  4.3   |  27  |  0.87    Ca    8.7      29 May 2019 06:26  Mg     1.9     05-29    TPro  6.2  /  Alb  3.5  /  TBili  0.4  /  DBili  <0.2  /  AST  100<H>  /  ALT  99<H>  /  AlkPhos  100  05-29    PT/INR - ( 28 May 2019 16:40 )   PT: 12.2 sec;   INR: 1.10          PTT - ( 30 May 2019 06:40 )  PTT:133.0 sec  Urinalysis Basic - ( 28 May 2019 22:12 )    Color: Yellow / Appearance: Clear / SG: <=1.005 / pH: x  Gluc: x / Ketone: NEGATIVE  / Bili: NEGATIVE / Urobili: 0.2 E.U./dL   Blood: x / Protein: Trace mg/dL / Nitrite: NEGATIVE   Leuk Esterase: NEGATIVE / RBC: < 5 /HPF / WBC < 5 /HPF   Sq Epi: x / Non Sq Epi: x / Bacteria: x    Culture - Blood (05.29.19 @ 02:51)    Specimen Source: .Blood Blood    Culture Results:   No growth to date.    Culture - Blood (05.29.19 @ 02:51)    Specimen Source: .Blood Blood    Culture Results:   No growth to date.    HIV-1/2 Antigen/Antibody Screen by [facility] (05.30.19 @ 07:37)    HIV-1/2 Combo Result: Nonreact: The HIV Ag/Ab Combo test performed screens for HIV-1 p24 antigen,  antibodies to HIV-1 (group M and group O), and antibodies to HIV-2. All  specimens repeatedly reactive will reflex to an HIV 1/2 antibody  confirmation and differentiation test. This assay detects p24 antigen  which may be present prior to the development of HIV antibodies,  therefore a reactive result with a negative HIV 1/2 AB Confirmation  should be followed up with HIV-1 RNA, HIV-2 RNA and repeat testing in 4-8  weeks. A nonreactive result does not preclude previous exposure to or  infection with HIV-1 or HIV-2. [facility] prohibits disclosure of this  result to any unauthorized party.    Ferritin, Serum in AM (05.30.19 @ 07:37)    Ferritin, Serum: 1835 ng/mL    Sedimentation Rate, Erythrocyte (05.30.19 @ 07:37)    Sedimentation Rate, Erythrocyte: 2 mm/Hr    C-Reactive Protein, Serum (05.28.19 @ 16:40)    C-Reactive Protein, Serum: 9.6 mg/dL    Acute Hepatitis Panel (05.29.19 @ 17:01)    Hepatitis C Virus Interpretation: Nonreact: Hepatitis C AB  S/CO Ratio                        Interpretation  < 1.0                                     Non-Reactive  1.0 - 4.9                           Weakly-Reactive  > 5.0                                 Reactive  Non-Reactive: A person witha non-reactive HCV antibody result is  considered uninfected.  No further action is needed unless recent  infection is suspected.  In these cases, consider repeat testing later to  detect seroconversion..  Weakly-Reactive: HCV antibody test is abnormal, HCV RNA Qualitative test  will follow.  Reactive: HCV antibody test is abnormal, HCV RNA Qualitative test will  follow.  Note: HCV antibody testing is performed on the Abbott  system.    Hepatitis C Virus S/CO Ratio: 0.11 S/CO    Hepatitis B Core IgM Antibody: Nonreact    Hepatitis B Surface Antigen: Nonreact    Hepatitis A IgM Antibody: NONREACT    Rapid Respiratory Viral Panel (05.29.19 @ 17:46)    Rapid RVP Result: NotDetec: This Respiratory Panel uses polymerase chain reaction (PCR) to detect for  adenovirus; coronavirus (HKU1, NL63, 229E, OC43); human metapneumovirus  (hMPV); human enterovirus/rhinovirus (Entero/RV); influenza A; influenza  A/H1; influenza A/H3; influenza A/H1-2009; influenza B; parainfluenza  viruses 1, 2, 3, 4; respiratory syncytial virus; Mycoplasma pneumoniae;  and Chlamydophila pneumoniae.      RADIOLOGY & ADDITIONAL TESTS:  US Duplex Venous Lower Ext Complete, Bilateral (05.29.19 @ 12:25)  IMPRESSION:  No deep vein thrombosis seen.    RICO BARRIENTOS M.D., RADIOLOGY RESIDENT  This document has been electronically signed.  FORD LEDESMA M.D., ATTENDING RADIOLOGIST  This document has been electronically signed. May 29 2019  1:39PM      US Abdomen Complete (05.29.19 @ 12:04)   IMPRESSION:  1.  Patient is status post splenectomy. There are multiple round   structures in the left upper quadrant consistent with splenosis, similar   to CT angiogram dated 5/28/2019.    2.  Pancreas not well visualized, appears normal on CT.    "Thank you for the opportunity to participate in the care of this   patient."    RICO BARRIENTOS M.D., RADIOLOGY RESIDENT  This document has been electronically signed.  FORD LEDESMA M.D., ATTENDING RADIOLOGIST  This document has been electronically signed. May 29 2019  1:36PM INTERNAL MEDICINE PROGRESS NOTE     INTERVAL HPI/OVERNIGHT EVENTS: Tmax 102 yesterday afternoon. Low grade temp 100.4 in the evening. Reports improvement in headache to about 4/10, overall feeling improved.     VITAL SIGNS:  T(F): 97.9 (05-30-19 @ 06:21)  HR: 80 (05-30-19 @ 08:50)  BP: 116/68 (05-30-19 @ 08:50)  RR: 16 (05-30-19 @ 08:50)  SpO2: 96% (05-30-19 @ 08:50)  Wt(kg): --    PHYSICAL EXAM:    Constitutional: appears well, laying in bed comfortably   Eyes: EOMI, PERLL, no visual field defects  ENMT: MMM, no oral lesions/ulcers   Neck: supple  Respiratory: CTAB, no rales, rhonchi, wheezing  Cardiovascular: S1/S2, no m/r/g  Gastrointestinal: soft, NT/ND, no hepatomegaly, s/p splenectomy w/residual scarring of abdominal wall  Extremities: wwp, no joint swelling      MEDICATIONS  (STANDING):  amoxicillin  875 milliGRAM(s)/clavulanate 1 Tablet(s) Oral every 12 hours  aspirin enteric coated 81 milliGRAM(s) Oral daily  atorvastatin 20 milliGRAM(s) Oral at bedtime  fluticasone propionate 50 MICROgram(s)/spray Nasal Spray 1 Spray(s) Both Nostrils two times a day  heparin  Infusion.  Unit(s)/Hr (15 mL/Hr) IV Continuous <Continuous>  metoprolol tartrate 12.5 milliGRAM(s) Oral two times a day  pantoprazole    Tablet 40 milliGRAM(s) Oral before breakfast    MEDICATIONS  (PRN):  acetaminophen   Tablet .. 650 milliGRAM(s) Oral every 6 hours PRN Temp greater or equal to 38C (100.4F), Mild Pain (1 - 3)  heparin  Injectable 6500 Unit(s) IV Push every 6 hours PRN For aPTT less than 40  heparin  Injectable 3000 Unit(s) IV Push every 6 hours PRN For aPTT between 40 - 57      Allergies    No Known Allergies    Intolerances        LABS:                        14.6   6.45  )-----------( 132      ( 30 May 2019 07:37 )             45.0     05-29    142  |  105  |  9   ----------------------------<  93  4.3   |  27  |  0.87    Ca    8.7      29 May 2019 06:26  Mg     1.9     05-29    TPro  6.2  /  Alb  3.5  /  TBili  0.4  /  DBili  <0.2  /  AST  100<H>  /  ALT  99<H>  /  AlkPhos  100  05-29    PT/INR - ( 28 May 2019 16:40 )   PT: 12.2 sec;   INR: 1.10          PTT - ( 30 May 2019 06:40 )  PTT:133.0 sec  Urinalysis Basic - ( 28 May 2019 22:12 )    Color: Yellow / Appearance: Clear / SG: <=1.005 / pH: x  Gluc: x / Ketone: NEGATIVE  / Bili: NEGATIVE / Urobili: 0.2 E.U./dL   Blood: x / Protein: Trace mg/dL / Nitrite: NEGATIVE   Leuk Esterase: NEGATIVE / RBC: < 5 /HPF / WBC < 5 /HPF   Sq Epi: x / Non Sq Epi: x / Bacteria: x    Culture - Blood (05.29.19 @ 02:51)    Specimen Source: .Blood Blood    Culture Results:   No growth to date.    Culture - Blood (05.29.19 @ 02:51)    Specimen Source: .Blood Blood    Culture Results:   No growth to date.    HIV-1/2 Antigen/Antibody Screen by [facility] (05.30.19 @ 07:37)    HIV-1/2 Combo Result: Nonreact: The HIV Ag/Ab Combo test performed screens for HIV-1 p24 antigen,  antibodies to HIV-1 (group M and group O), and antibodies to HIV-2. All  specimens repeatedly reactive will reflex to an HIV 1/2 antibody  confirmation and differentiation test. This assay detects p24 antigen  which may be present prior to the development of HIV antibodies,  therefore a reactive result with a negative HIV 1/2 AB Confirmation  should be followed up with HIV-1 RNA, HIV-2 RNA and repeat testing in 4-8  weeks. A nonreactive result does not preclude previous exposure to or  infection with HIV-1 or HIV-2. [facility] prohibits disclosure of this  result to any unauthorized party.    Ferritin, Serum in AM (05.30.19 @ 07:37)    Ferritin, Serum: 1835 ng/mL    Sedimentation Rate, Erythrocyte (05.30.19 @ 07:37)    Sedimentation Rate, Erythrocyte: 2 mm/Hr    C-Reactive Protein, Serum (05.28.19 @ 16:40)    C-Reactive Protein, Serum: 9.6 mg/dL    Acute Hepatitis Panel (05.29.19 @ 17:01)    Hepatitis C Virus Interpretation: Nonreact: Hepatitis C AB  S/CO Ratio                        Interpretation  < 1.0                                     Non-Reactive  1.0 - 4.9                           Weakly-Reactive  > 5.0                                 Reactive  Non-Reactive: A person witha non-reactive HCV antibody result is  considered uninfected.  No further action is needed unless recent  infection is suspected.  In these cases, consider repeat testing later to  detect seroconversion..  Weakly-Reactive: HCV antibody test is abnormal, HCV RNA Qualitative test  will follow.  Reactive: HCV antibody test is abnormal, HCV RNA Qualitative test will  follow.  Note: HCV antibody testing is performed on the [employer]  system.    Hepatitis C Virus S/CO Ratio: 0.11 S/CO    Hepatitis B Core IgM Antibody: Nonreact    Hepatitis B Surface Antigen: Nonreact    Hepatitis A IgM Antibody: NONREACT    Rapid Respiratory Viral Panel (05.29.19 @ 17:46)    Rapid RVP Result: NotDetec: This Respiratory Panel uses polymerase chain reaction (PCR) to detect for  adenovirus; coronavirus (HKU1, NL63, 229E, OC43); human metapneumovirus  (hMPV); human enterovirus/rhinovirus (Entero/RV); influenza A; influenza  A/H1; influenza A/H3; influenza A/H1-2009; influenza B; parainfluenza  viruses 1, 2, 3, 4; respiratory syncytial virus; Mycoplasma pneumoniae;  and Chlamydophila pneumoniae.      RADIOLOGY & ADDITIONAL TESTS:  US Duplex Venous Lower Ext Complete, Bilateral (05.29.19 @ 12:25)  IMPRESSION:  No deep vein thrombosis seen.    RICO BARRIENTOS M.D., RADIOLOGY RESIDENT  This document has been electronically signed.  FORD LEDESMA M.D., ATTENDING RADIOLOGIST  This document has been electronically signed. May 29 2019  1:39PM      US Abdomen Complete (05.29.19 @ 12:04)   IMPRESSION:  1.  Patient is status post splenectomy. There are multiple round   structures in the left upper quadrant consistent with splenosis, similar   to CT angiogram dated 5/28/2019.    2.  Pancreas not well visualized, appears normal on CT.    "Thank you for the opportunity to participate in the care of this   patient."    RICO BARRIENTOS M.D., RADIOLOGY RESIDENT  This document has been electronically signed.  FORD LEDESMA M.D., ATTENDING RADIOLOGIST  This document has been electronically signed. May 29 2019  1:36PM

## 2019-05-30 NOTE — PROGRESS NOTE ADULT - PROBLEM SELECTOR PLAN 1
-CHADS-VASC 2 given old stroke seen on CT head.  -Currently in NSR  -Heparin gtt initiated at Dx AFIB, now discontinued today  -Started on Eliquis 5mg q12h for anticoagulation and Metoprolol 12.5mg BID for - rate control.  -Given history of old CVA, continue Atorvastatin 20mg QHS  - R/O ACS with negative trops x 3  - ECHO 5/29/19:  EF 55%, no WMA

## 2019-05-30 NOTE — CONSULT NOTE ADULT - REASON FOR ADMISSION
headache, dizziness and weakness x 3 days.

## 2019-05-30 NOTE — PROGRESS NOTE ADULT - SUBJECTIVE AND OBJECTIVE BOX
Transfer Note: [facility] to [facility]    HPI/Hospital Course: 64yo M with PMH of HLD and GERD, who presented to [facility] 5/28 with two days of headache, dizziness, and weakness, found to be in rapid AFib and admitted to [facility] for further management. Patient was started on Lopressor and heparin gtt and converted to NSR. He was transitioned from heparin to Eliquis. On admission, patient also noted to be febrile to 102, meeting sepsis criteria with elevated temp, HR, and 30% bands. Labs significant for elevated AST, ALT, and GGT, as well as elevated ferritin and CRP with normal ESR. Source unknown, CXR clear, UA negative. Medicine was consulted for fever. Of note, per patient and PCP, he has intermittently been on antibiotics for presumed sinus infection since 01/2019, most recently prescribed Augmentin for URI, currently on day 10. Patient has remained in NSR and is stable from cardiology standpoint, being transferred to medicine for further workup of fever of unknown origin.    INTERVAL HISTORY: Patient reports feeling well at this time, but he still has dizziness, though it has improved since yesterday. He denies subjective fevers or chills, cough, shortness of breath, chest pain, palpitations, abdominal pain, nausea, vomiting, diarrhea, dysuria, joint pain, or rashes.       Vital Signs Last 24 Hrs  T(C): 36.2 (30 May 2019 14:38), Max: 38 (29 May 2019 19:30)  T(F): 97.1 (30 May 2019 14:38), Max: 100.4 (29 May 2019 19:30)  HR: 81 (30 May 2019 13:14) (71 - 83)  BP: 113/72 (30 May 2019 13:14) (113/72 - 135/76)  RR: 16 (30 May 2019 13:14) (16 - 18)  SpO2: 96% (30 May 2019 13:14) (95% - 96%)      I&O's Summary    29 May 2019 07:01  -  30 May 2019 07:00  --------------------------------------------------------  IN: 297 mL / OUT: 0 mL / NET: 297 mL    30 May 2019 07:01  -  30 May 2019 16:12  --------------------------------------------------------  IN: 518 mL / OUT: 0 mL / NET: 518 mL      PHYSICAL EXAM:  General: NAD, adult male, appears comfortable, cooperative with exam  HEENT: NCAT, PERRL, EOMI, no conjunctival pallor or scleral icterus, MMM, no oropharyngeal/tonsillar exudates or vesicles  Neck: supple, no JVD, no lymphadenopathy  Respiratory: no increased work of breathing, CTAB, no w/r/r   Cardiovascular: RRR, normal S1/S2, no m/r/g   Abdominal: soft, NTND, +BS  Extremities: WWP, no cyanosis, clubbing or edema  Vascular: radial pulses and DP 2+ bilaterally   Neurological: AAOx3, no CN deficits, no nystagmus, strength 5/5 in all extremities, sensation intact throughout  Skin: no gross skin abnormalities or rashes      LABS:                        14.6   6.45  )-----------( 132      ( 30 May 2019 07:37 )             45.0     05-29    142  |  105  |  9   ----------------------------<  93  4.3   |  27  |  0.87    Ca    8.7      29 May 2019 06:26  Mg     1.9     05-29    TPro  6.2  /  Alb  3.5  /  TBili  0.4  /  DBili  <0.2  /  AST  100<H>  /  ALT  99<H>  /  AlkPhos  100  05-29    PT/INR - ( 28 May 2019 16:40 )   PT: 12.2 sec;   INR: 1.10     PTT - ( 30 May 2019 06:40 )  PTT:133.0 sec    Urinalysis Basic - ( 28 May 2019 22:12 )  Color: Yellow / Appearance: Clear / SG: <=1.005 / pH: x  Gluc: x / Ketone: NEGATIVE  / Bili: NEGATIVE / Urobili: 0.2 E.U./dL   Blood: x / Protein: Trace mg/dL / Nitrite: NEGATIVE   Leuk Esterase: NEGATIVE / RBC: < 5 /HPF / WBC < 5 /HPF   Sq Epi: x / Non Sq Epi: x / Bacteria: x      RADIOLOGY & ADDITIONAL TESTS:  No new imaging.      MEDICATIONS  (STANDING):  amoxicillin  875 milliGRAM(s)/clavulanate 1 Tablet(s) Oral every 12 hours  apixaban 5 milliGRAM(s) Oral every 12 hours  aspirin enteric coated 81 milliGRAM(s) Oral daily  atorvastatin 20 milliGRAM(s) Oral at bedtime  fluticasone propionate 50 MICROgram(s)/spray Nasal Spray 1 Spray(s) Both Nostrils two times a day  metoprolol tartrate 12.5 milliGRAM(s) Oral two times a day  pantoprazole    Tablet 40 milliGRAM(s) Oral before breakfast    MEDICATIONS  (PRN):  acetaminophen   Tablet .. 650 milliGRAM(s) Oral every 6 hours PRN Temp greater or equal to 38C (100.4F), Mild Pain (1 - 3) Transfer Note: [facility] to [facility]    HPI/Hospital Course: 64yo M with PMH of HLD and GERD, s/p splenectomy, who presented to [facility] 5/28 with two days of headache, dizziness, and weakness, found to be in rapid AFib and admitted to [facility] for further management. Patient was started on Lopressor and heparin gtt and converted to NSR. He was transitioned from heparin to Eliquis. On admission, patient also noted to be febrile to 102, meeting sepsis criteria with elevated temp, HR, and 30% bands. Labs significant for elevated AST, ALT, and GGT, as well as elevated ferritin and CRP with normal ESR. Source unknown, CXR clear, UA negative. Medicine was consulted for fever. Of note, per patient and PCP, he has intermittently been on antibiotics for presumed sinus infection since 01/2019, most recently prescribed Augmentin for URI, currently on day 10. Patient has remained in NSR and is stable from cardiology standpoint, being transferred to medicine for further workup of fever of unknown origin.    INTERVAL HISTORY: Patient reports feeling well at this time, but he still has dizziness, though it has improved since yesterday. He denies subjective fevers or chills, cough, shortness of breath, chest pain, palpitations, abdominal pain, nausea, vomiting, diarrhea, dysuria, joint pain, or rashes.       Vital Signs Last 24 Hrs  T(C): 36.2 (30 May 2019 14:38), Max: 38 (29 May 2019 19:30)  T(F): 97.1 (30 May 2019 14:38), Max: 100.4 (29 May 2019 19:30)  HR: 81 (30 May 2019 13:14) (71 - 83)  BP: 113/72 (30 May 2019 13:14) (113/72 - 135/76)  RR: 16 (30 May 2019 13:14) (16 - 18)  SpO2: 96% (30 May 2019 13:14) (95% - 96%)      I&O's Summary    29 May 2019 07:01  -  30 May 2019 07:00  --------------------------------------------------------  IN: 297 mL / OUT: 0 mL / NET: 297 mL    30 May 2019 07:01  -  30 May 2019 16:12  --------------------------------------------------------  IN: 518 mL / OUT: 0 mL / NET: 518 mL      PHYSICAL EXAM:  General: NAD, adult male, appears comfortable, cooperative with exam  HEENT: NCAT, PERRL, EOMI, no conjunctival pallor or scleral icterus, MMM, no oropharyngeal/tonsillar exudates or vesicles  Neck: supple, no JVD, no lymphadenopathy  Respiratory: no increased work of breathing, CTAB, no w/r/r   Cardiovascular: RRR, normal S1/S2, no m/r/g   Abdominal: soft, NTND, +BS  Extremities: WWP, no cyanosis, clubbing or edema  Vascular: radial pulses and DP 2+ bilaterally   Neurological: AAOx3, no CN deficits, no nystagmus, strength 5/5 in all extremities, sensation intact throughout  Skin: no gross skin abnormalities or rashes      LABS:                        14.6   6.45  )-----------( 132      ( 30 May 2019 07:37 )             45.0     05-29    142  |  105  |  9   ----------------------------<  93  4.3   |  27  |  0.87    Ca    8.7      29 May 2019 06:26  Mg     1.9     05-29    TPro  6.2  /  Alb  3.5  /  TBili  0.4  /  DBili  <0.2  /  AST  100<H>  /  ALT  99<H>  /  AlkPhos  100  05-29    PT/INR - ( 28 May 2019 16:40 )   PT: 12.2 sec;   INR: 1.10     PTT - ( 30 May 2019 06:40 )  PTT:133.0 sec    Urinalysis Basic - ( 28 May 2019 22:12 )  Color: Yellow / Appearance: Clear / SG: <=1.005 / pH: x  Gluc: x / Ketone: NEGATIVE  / Bili: NEGATIVE / Urobili: 0.2 E.U./dL   Blood: x / Protein: Trace mg/dL / Nitrite: NEGATIVE   Leuk Esterase: NEGATIVE / RBC: < 5 /HPF / WBC < 5 /HPF   Sq Epi: x / Non Sq Epi: x / Bacteria: x      RADIOLOGY & ADDITIONAL TESTS:  No new imaging.      MEDICATIONS  (STANDING):  amoxicillin  875 milliGRAM(s)/clavulanate 1 Tablet(s) Oral every 12 hours  apixaban 5 milliGRAM(s) Oral every 12 hours  aspirin enteric coated 81 milliGRAM(s) Oral daily  atorvastatin 20 milliGRAM(s) Oral at bedtime  fluticasone propionate 50 MICROgram(s)/spray Nasal Spray 1 Spray(s) Both Nostrils two times a day  metoprolol tartrate 12.5 milliGRAM(s) Oral two times a day  pantoprazole    Tablet 40 milliGRAM(s) Oral before breakfast    MEDICATIONS  (PRN):  acetaminophen   Tablet .. 650 milliGRAM(s) Oral every 6 hours PRN Temp greater or equal to 38C (100.4F), Mild Pain (1 - 3)

## 2019-05-30 NOTE — PROGRESS NOTE ADULT - PROBLEM SELECTOR PLAN 3
Patient presented with two days of dizziness, which has improved but is persistent. Neuro exam intact. CT head with right parieto-occipital encephalomalacia and gliosis likely sequelae of prior infarct and/or contusion.  - Dr. Collins consulted by cardiology, f/u recs  - MRI, carotid Dopplers per Dr. Collins

## 2019-05-30 NOTE — CONSULT NOTE ADULT - ATTENDING COMMENTS
I have reviewed the medical record, including laboratory and radiographic studies, interviewed and examined the patient and discussed the plan with Dr. Weaver, the ID Resident.  Agree with above. Mr. Rizo described the following sequence of events to me as the start of his illness: On 5/25, he gathered maneuver from the horses on a neighboring farm and spread it over his property as fertilizer. His wife had noted that a chicken coop had just been moved – there were weeds underneath that she wanted to cook.  He pulled the weeds for her.  ~3 hrs later, he noted onset of weakness that persisted through the weekend.  He then felt very ill at work on 5/28 and went to ED.  In addition to horses, there are chickens and dogs on the neighboring farm.  There are deer and mice on his property.  His PE is unremarkable except for marked bilateral conjunctival injection.  Would expand w/u as above.  Will continue to follow with you – ID Team 1.

## 2019-05-30 NOTE — PROGRESS NOTE ADULT - PROBLEM SELECTOR PLAN 5
LFTs elevated on admit, slightly downtrended  - s/p splenectomy, multiple round structures in LUQ consistent with splenosis. Similar to CT angiogram dated 5/28/2019. Pancreas not well visualized, appears normal on CT.  - hepatitis panel negative    DVT PPx: Eliquis   DISPO: Pending clinical progression    Case discussed with Dr. Nye

## 2019-05-30 NOTE — PROGRESS NOTE ADULT - ASSESSMENT
62yo M with PMH of HLD and GERD, who presented to [facility] 5/28 with two days of headache, dizziness, and weakness, found to be in rapid AFib and admitted to [facility] for further management, now being transferred to medicine for further workup of fever with unknown source.

## 2019-05-30 NOTE — PROGRESS NOTE ADULT - ASSESSMENT
63M w/PMH of splenectomy (42 years ago), b/l inguinal hernia repair (2017), ?GERD vs gastritis on EGD 1 year ago p/w to [facility] with weakness, dizziness and headaches for past 4-5 days, found to be in rapid afib and admitted ot 5U for further cardiac workup.  Medicine consulted in setting of fever and mildly elevated LFTS.     #Fevers: unclear source, ddx includes infection, malignancy, or autoimmune process such as vasculitis vs rheumatoid arthritis vs giant cell arteritis.  -CBC from 5/29 w/30% bands and Tmax of 102. Pt's only complaint is headache, dizziness and transient visual changes 4 days ago.  -CTPE w/out evidence of consolidation, FluA/B/RSV negative, UA negative  -ESR wnl, CRP 9.6, ferritin 1835  -RVP, HIV, hepatitis panel negative  -Blood cultures x1 NGTD  -Would send FUO lab workup, typically done after 3 weeks of high grade fevers, however pt reports he has been treated with 4 courses of abx for 3 months for lymphadenopathy, fevers, URI symptoms.  f/u quantiferon, HIV, RF, CPK, MICHAEL, SPEP.  -Recommend ID consult    #Elevated LFTs: Elevated GGT, AST/ALT almost 1:1 ratio, <4x upper limit of normal suggesting possible NAFLD, unlikely etoh related as pt quit over 1 year ago or viral hepatitis as elevations would be expected to be much higher at >25 upper limit of normal and hepatitis panel negative.   -Normal synthetic liver function   -RUQ w/normal gallbladder, biliary ducts and liver   -Trend CMP 63M w/PMH of splenectomy (42 years ago), b/l inguinal hernia repair (2017), GERD p/w to [facility] with weakness, dizziness and headaches for past 4-5 days, found to be in rapid afib and admitted ot 5U for further cardiac workup.  Medicine consulted in setting of fever and mildly elevated LFTS.     #Fevers: unclear source, ddx includes infection, malignancy, or autoimmune process such as vasculitis vs rheumatoid arthritis vs giant cell arteritis.  -CBC from 5/29 w/30% bands and Tmax of 102. Pt's only complaint is headache, dizziness and transient visual changes 4 days ago.  -CTPE w/out evidence of consolidation, FluA/B/RSV negative, UA negative  -ESR wnl, CRP 9.6, ferritin 1835  -RVP, HIV, hepatitis panel negative  -Blood cultures x1 NGTD  -Would send FUO lab workup, typically done after 3 weeks of high grade fevers, however pt reports he has been treated with 4 courses of abx for 3 months for lymphadenopathy, fevers, URI symptoms.  f/u quantiferon, RF, CPK, MICHAEL, SPEP.  -Recommend ID consult  -Collateral from Dr. De Leon's office reports negative Lyme in April, normal LFTs on in April. January- Augmentin, April- amoxicillin, 5/17 Augmentin for sinus infection      #Elevated LFTs: Elevated GGT, AST/ALT almost 1:1 ratio, <4x upper limit of normal suggesting possible NAFLD, unlikely etoh related as pt quit over 1 year ago or viral hepatitis as elevations would be expected to be much higher at >25 upper limit of normal and hepatitis panel negative.   -Normal synthetic liver function, normal LFTs in recent labs from April w/PMD  -RUQ w/normal gallbladder, biliary ducts and liver   -Trend CMP 63M w/PMH of splenectomy (42 years ago), b/l inguinal hernia repair (2017), GERD p/w to [facility] with weakness, dizziness and headaches for past 4-5 days, found to be in rapid afib and admitted ot 5U for further cardiac workup.  Medicine consulted in setting of fever and mildly elevated LFTS.     #Fevers: unclear source, ddx includes infection, malignancy, or autoimmune process such as vasculitis. BC from 5/29 w/30% bands and Tmax of 102. Pt's only complaint is headache, dizziness and transient visual changes 4 days ago.  -CTPE w/out evidence of consolidation, FluA/B/RSV negative, UA negative  -ESR wnl, CRP 9.6, ferritin 1835  -RVP, HIV, hepatitis panel negative  -Blood cultures x1 NGTD  -f/u FUO lab workup, typically done after 3 weeks of high grade fevers, however pt reports he has been treated with multiple courses of abx for 3 months for lymphadenopathy, fevers, URI symptoms presumed to be due to bacterial sinusitis  -Pending QuantiFeron, RF, CPK, MICHAEL, SPEP, ANCA  -Recommend ID consult in setting of persistent fevers even on PO antibiotics and bandemia.   -Collateral from Dr. De Leon's office reports negative Lyme in April, normal LFTs on in April. January- Augmentin, April- amoxicillin, 5/17 Augmentin for sinus infection      #Elevated LFTs: Elevated GGT, AST/ALT almost 1:1 ratio, <4x upper limit of normal suggesting possible NAFLD, unlikely etoh related as pt quit over 1 year ago or viral hepatitis as elevations would be expected to be much higher at >25 upper limit of normal and hepatitis panel negative.   -Normal synthetic liver function, normal LFTs in recent labs from April w/PMD  -RUQ w/normal gallbladder, biliary ducts and liver   -Trend CMP    Discussed with medicine attending, Dr. Pulliam 63M w/PMH of splenectomy (42 years ago), b/l inguinal hernia repair (2017), GERD p/w to [facility] with weakness, dizziness and headaches for past 4-5 days, found to be in rapid afib and admitted ot 5U for further cardiac workup.  Medicine consulted in setting of fever and mildly elevated LFTS. Pt accepted to medicine service for further workup.     #Fevers: unclear source, ddx includes infection, malignancy, or autoimmune process such as vasculitis. BC from 5/29 w/30% bands and Tmax of 102. Pt's only complaint is headache, dizziness and transient visual changes 4 days ago.  -CTPE w/out evidence of consolidation, FluA/B/RSV negative, UA negative  -ESR wnl, CRP 9.6, ferritin 1835  -RVP, HIV, hepatitis panel negative  -Blood cultures x1 NGTD  -f/u FUO lab workup, typically done after 3 weeks of high grade fevers, however pt reports he has been treated with multiple courses of abx for 3 months for lymphadenopathy, fevers, URI symptoms presumed to be due to bacterial sinusitis  -Pending QuantiFeron, RF, CPK, MICHAEL, SPEP, ANCA  -Recommend ID consult in setting of persistent fevers even on PO antibiotics and bandemia.   -Collateral from Dr. De Leon's office reports negative Lyme in April, normal LFTs on in April. January- Augmentin, April- amoxicillin, 5/17 Augmentin for sinus infection      #Elevated LFTs: Elevated GGT, AST/ALT almost 1:1 ratio, <4x upper limit of normal suggesting possible NAFLD, unlikely etoh related as pt quit over 1 year ago or viral hepatitis as elevations would be expected to be much higher at >25 upper limit of normal and hepatitis panel negative.   -Normal synthetic liver function, normal LFTs in recent labs from April w/PMD  -RUQ w/normal gallbladder, biliary ducts and liver   -Trend CMP    Discussed with medicine attending, Dr. Pulliam 63M w/PMH of splenectomy (42 years ago), b/l inguinal hernia repair (2017), GERD p/w to [facility] with weakness, dizziness and headaches for past 4-5 days, found to be in rapid afib and admitted ot 5U for further cardiac workup.  Medicine consulted in setting of fever and mildly elevated LFTS. Pt accepted to medicine service for further workup.     #Fevers: unclear source, ddx includes infection, malignancy, or autoimmune process such as vasculitis. BC from 5/29 w/30% bands and Tmax of 102. Pt's only complaint is headache, dizziness and transient visual changes 4 days ago.  -CTPE w/out evidence of consolidation, FluA/B/RSV negative, UA negative  -ESR wnl, CRP 9.6, ferritin 1835  -RVP, HIV, hepatitis panel negative  -Blood cultures x1 NGTD  -f/u FUO lab workup, typically done after 3 weeks of high grade fevers, however pt reports he has been treated with multiple courses of abx for 3 months for lymphadenopathy, fevers, URI symptoms presumed to be due to bacterial sinusitis  -Pending QuantiFeron, RF, CPK, MICHAEL, SPEP, ANCA  -Recommend ID consult in setting of persistent fevers even on PO antibiotics and bandemia.   -ENT consulted: No e/o of acute infection to cause fevers.  -Collateral from Dr. De Leon's office reports negative Lyme in April, normal LFTs on in April. January- Augmentin, April- amoxicillin, 5/17 Augmentin for sinus infection      #Dizziness: Improved per pt, able to ambulate w/out symptoms. NO findings on neurologic exam.   -Neurology consulted by primary team  -f/u MRI     #Paroxysmal afib: likely episodic in setting of sepsis at [facility]. converted to NSR upon arrival to 5U. CHADVASC 0 at this time, Pt transitioned to Eliquis per primary team today.  -ECHO without any valvular disease or dysfunction   -C/w Metoprolol 12.5mg BID, can transition to long acting   -C/w Lipitor 20mg     #Elevated LFTs: Elevated GGT, AST/ALT almost 1:1 ratio, <4x upper limit of normal suggesting possible NAFLD, unlikely etoh related as pt quit over 1 year ago or viral hepatitis as elevations would be expected to be much higher at >25 upper limit of normal and hepatitis panel negative.   -Normal synthetic liver function, normal LFTs in recent labs from April w/PMD  -RUQ w/normal gallbladder, biliary ducts and liver   -Trend CMP    Discussed with medicine attending, Dr. Pulliam 63M w/PMH of splenectomy (42 years ago), b/l inguinal hernia repair (2017), GERD p/w to [facility] with weakness, dizziness and headaches for past 4-5 days, found to be in rapid afib and admitted ot 5U for further cardiac workup.  Medicine consulted in setting of fever and mildly elevated LFTS. Pt accepted to medicine service for further workup.     #Fevers: unclear source, ddx includes infection, malignancy, or autoimmune process such as vasculitis. BC from 5/29 w/30% bands and Tmax of 102. Pt's only complaint is headache, dizziness and transient visual changes 4 days ago.  -CTPE w/out evidence of consolidation, FluA/B/RSV negative, UA negative  -ESR wnl, RF wnl  -CRP 9.6, ferritin 1835 (acute phase reactants)  -RVP, HIV, hepatitis panel negative  -Blood cultures x1 NGTD  -f/u FUO lab workup, typically done after 3 weeks of high grade fevers, however pt reports he has been treated with multiple courses of abx for 3 months for lymphadenopathy, fevers, URI symptoms presumed to be due to bacterial sinusitis  -Pending QuantiFeron, RF, CPK, MICHAEL, SPEP, ANCA, EBV, CMV serologies   -Recommend ID consult in setting of persistent fevers even on PO antibiotics and bandemia.   -ENT consulted: No e/o of acute infection to cause fevers.  -Collateral from Dr. De Leon's office reports negative Lyme in April, normal LFTs on in April. January- Augmentin, April- amoxicillin, 5/17 Augmentin for sinus infection      #Dizziness: Improved per pt, able to ambulate w/out symptoms. NO findings on neurologic exam.   -Neurology consulted by primary team  -f/u MRI     #Thrombocytopenia: mild, possibly related to underlying infection vs inflammatory process.   -No signs of bleeding  -Continue to trend CBC     #Paroxysmal afib: likely episodic in setting of sepsis at [facility]. converted to NSR upon arrival to 5U. CHADVASC 0 at this time, Pt transitioned to Eliquis per primary team today.  -ECHO without any valvular disease or dysfunction   -C/w Metoprolol 12.5mg BID, can transition to long acting   -C/w Lipitor 20mg     #Elevated LFTs: Elevated GGT, AST/ALT almost 1:1 ratio, <4x upper limit of normal suggesting possible NAFLD, unlikely etoh related as pt quit over 1 year ago or viral hepatitis as elevations would be expected to be much higher at >25 upper limit of normal and hepatitis panel negative.   -Normal synthetic liver function, normal LFTs in recent labs from April w/PMD  -RUQ w/normal gallbladder, biliary ducts and liver   -Trend CMP    Discussed with medicine attending, Dr. Pulliam 63M w/PMH of splenectomy (42 years ago), b/l inguinal hernia repair (2017), GERD p/w to [facility] with weakness, dizziness and headaches for past 4-5 days, found to be in rapid afib and admitted ot 5U for further cardiac workup.  Medicine consulted in setting of fever and mildly elevated LFTS. Pt accepted to medicine service for further workup.     #Fevers: unclear source, ddx includes infection, malignancy, or autoimmune process such as vasculitis. BC from 5/29 w/30% bands and Tmax of 102. Pt's only complaint is headache, dizziness and transient visual changes 4 days ago.  -CTPE w/out evidence of consolidation, FluA/B/RSV negative, UA negative  -ESR wnl, RF wnl  -CRP 9.6, ferritin 1835 (acute phase reactants)  -RVP, HIV, hepatitis panel negative  -Blood cultures x1 NGTD  -f/u FUO lab workup, typically done after 3 weeks of high grade fevers, however pt reports he has been treated with multiple courses of abx for 3 months for lymphadenopathy, fevers, URI symptoms presumed to be due to bacterial sinusitis  -Pending QuantiFeron, RF, CPK, MICHAEL, SPEP, ANCA, EBV, CMV serologies   -Recommend ID consult in setting of persistent fevers even on PO antibiotics and bandemia.   -ENT consulted: No e/o of acute infection to cause fevers.  -Collateral from Dr. De Leon's office reports negative Lyme in April, normal LFTs on in April. January- Augmentin, April- amoxicillin, 5/17 Augmentin for sinus infection      #Dizziness: Improved per pt, able to ambulate w/out symptoms. NO findings on neurologic exam.   -Neurology consulted by primary team. Recommending MRI brain    #Thrombocytopenia: mild, possibly related to underlying infection vs inflammatory process.   -No signs of bleeding  -Continue to trend CBC     #Paroxysmal afib: likely episodic in setting of sepsis at [facility]. converted to NSR upon arrival to 5U. CHADVASC 0 at this time, Pt transitioned to Eliquis per primary team today.  -ECHO without any valvular disease or dysfunction   -C/w Metoprolol 12.5mg BID, can transition to long acting   -C/w Lipitor 20mg   -Can continue with ASA, instead of full AC w/Eliquis as pt with CHADVASC of 0 at this time.     #Elevated LFTs: Elevated GGT, AST/ALT almost 1:1 ratio, <4x upper limit of normal suggesting possible NAFLD, unlikely etoh related as pt quit over 1 year ago or viral hepatitis as elevations would be expected to be much higher at >25 upper limit of normal and hepatitis panel negative.   -Normal synthetic liver function, normal LFTs in recent labs from April w/PMD  -RUQ w/normal gallbladder, biliary ducts and liver   -Trend CMP    Discussed with medicine attending, Dr. Pulliam

## 2019-05-31 ENCOUNTER — TRANSCRIPTION ENCOUNTER (OUTPATIENT)
Age: 64
End: 2019-05-31

## 2019-05-31 ENCOUNTER — RESULT REVIEW (OUTPATIENT)
Age: 64
End: 2019-05-31

## 2019-05-31 VITALS
DIASTOLIC BLOOD PRESSURE: 84 MMHG | HEART RATE: 84 BPM | OXYGEN SATURATION: 96 % | SYSTOLIC BLOOD PRESSURE: 124 MMHG | RESPIRATION RATE: 16 BRPM

## 2019-05-31 LAB
% ALBUMIN: 55.1 % — SIGNIFICANT CHANGE UP
% ALPHA 1: 7 % — SIGNIFICANT CHANGE UP
% ALPHA 2: 12.5 % — SIGNIFICANT CHANGE UP
% BETA: 13.1 % — SIGNIFICANT CHANGE UP
% GAMMA: 12.3 % — SIGNIFICANT CHANGE UP
ALBUMIN SERPL ELPH-MCNC: 3.2 G/DL — LOW (ref 3.6–5.5)
ALBUMIN/GLOB SERPL ELPH: 1.2 RATIO — SIGNIFICANT CHANGE UP
ALPHA1 GLOB SERPL ELPH-MCNC: 0.4 G/DL — SIGNIFICANT CHANGE UP (ref 0.1–0.4)
ALPHA2 GLOB SERPL ELPH-MCNC: 0.7 G/DL — SIGNIFICANT CHANGE UP (ref 0.5–1)
ANA PAT FLD IF-IMP: ABNORMAL
ANA TITR SER: ABNORMAL
ANION GAP SERPL CALC-SCNC: 10 MMOL/L — SIGNIFICANT CHANGE UP (ref 5–17)
AUTO DIFF PNL BLD: ABNORMAL
B-GLOBULIN SERPL ELPH-MCNC: 0.8 G/DL — SIGNIFICANT CHANGE UP (ref 0.5–1)
BUN SERPL-MCNC: 11 MG/DL — SIGNIFICANT CHANGE UP (ref 7–23)
C-ANCA SER-ACNC: NEGATIVE — SIGNIFICANT CHANGE UP
CALCIUM SERPL-MCNC: 9.5 MG/DL — SIGNIFICANT CHANGE UP (ref 8.4–10.5)
CHLORIDE SERPL-SCNC: 102 MMOL/L — SIGNIFICANT CHANGE UP (ref 96–108)
CMV DNA CSF QL NAA+PROBE: SIGNIFICANT CHANGE UP
CMV DNA SPEC NAA+PROBE-LOG#: SIGNIFICANT CHANGE UP LOGIU/ML
CO2 SERPL-SCNC: 30 MMOL/L — SIGNIFICANT CHANGE UP (ref 22–31)
CREAT SERPL-MCNC: 0.75 MG/DL — SIGNIFICANT CHANGE UP (ref 0.5–1.3)
GAMMA GLOBULIN: 0.7 G/DL — SIGNIFICANT CHANGE UP (ref 0.6–1.6)
GLUCOSE SERPL-MCNC: 104 MG/DL — HIGH (ref 70–99)
HCT VFR BLD CALC: 45.9 % — SIGNIFICANT CHANGE UP (ref 39–50)
HGB BLD-MCNC: 15.1 G/DL — SIGNIFICANT CHANGE UP (ref 13–17)
HSV1 IGG SER-ACNC: 56.1 INDEX — HIGH
HSV1 IGG SERPL QL IA: POSITIVE
HSV2 IGG FLD-ACNC: 0.21 INDEX — SIGNIFICANT CHANGE UP
HSV2 IGG SERPL QL IA: NEGATIVE — SIGNIFICANT CHANGE UP
MAGNESIUM SERPL-MCNC: 1.9 MG/DL — SIGNIFICANT CHANGE UP (ref 1.6–2.6)
MCHC RBC-ENTMCNC: 29.7 PG — SIGNIFICANT CHANGE UP (ref 27–34)
MCHC RBC-ENTMCNC: 32.9 GM/DL — SIGNIFICANT CHANGE UP (ref 32–36)
MCV RBC AUTO: 90.4 FL — SIGNIFICANT CHANGE UP (ref 80–100)
NRBC # BLD: 0 /100 WBCS — SIGNIFICANT CHANGE UP (ref 0–0)
P-ANCA SER-ACNC: NEGATIVE — SIGNIFICANT CHANGE UP
PLATELET # BLD AUTO: 154 K/UL — SIGNIFICANT CHANGE UP (ref 150–400)
POTASSIUM SERPL-MCNC: 4.3 MMOL/L — SIGNIFICANT CHANGE UP (ref 3.5–5.3)
POTASSIUM SERPL-SCNC: 4.3 MMOL/L — SIGNIFICANT CHANGE UP (ref 3.5–5.3)
PROT PATTERN SERPL ELPH-IMP: SIGNIFICANT CHANGE UP
RBC # BLD: 5.08 M/UL — SIGNIFICANT CHANGE UP (ref 4.2–5.8)
RBC # FLD: 14.7 % — HIGH (ref 10.3–14.5)
SODIUM SERPL-SCNC: 142 MMOL/L — SIGNIFICANT CHANGE UP (ref 135–145)
WBC # BLD: 10.65 K/UL — HIGH (ref 3.8–10.5)
WBC # FLD AUTO: 10.65 K/UL — HIGH (ref 3.8–10.5)

## 2019-05-31 PROCEDURE — 70551 MRI BRAIN STEM W/O DYE: CPT | Mod: 26

## 2019-05-31 PROCEDURE — 93970 EXTREMITY STUDY: CPT

## 2019-05-31 PROCEDURE — 82550 ASSAY OF CK (CPK): CPT

## 2019-05-31 PROCEDURE — 87486 CHLMYD PNEUM DNA AMP PROBE: CPT

## 2019-05-31 PROCEDURE — 86696 HERPES SIMPLEX TYPE 2 TEST: CPT

## 2019-05-31 PROCEDURE — 94640 AIRWAY INHALATION TREATMENT: CPT

## 2019-05-31 PROCEDURE — 86038 ANTINUCLEAR ANTIBODIES: CPT

## 2019-05-31 PROCEDURE — 71275 CT ANGIOGRAPHY CHEST: CPT

## 2019-05-31 PROCEDURE — 81001 URINALYSIS AUTO W/SCOPE: CPT

## 2019-05-31 PROCEDURE — 76700 US EXAM ABDOM COMPLETE: CPT

## 2019-05-31 PROCEDURE — 86480 TB TEST CELL IMMUN MEASURE: CPT

## 2019-05-31 PROCEDURE — 83735 ASSAY OF MAGNESIUM: CPT

## 2019-05-31 PROCEDURE — 96374 THER/PROPH/DIAG INJ IV PUSH: CPT | Mod: XU

## 2019-05-31 PROCEDURE — 85027 COMPLETE CBC AUTOMATED: CPT

## 2019-05-31 PROCEDURE — 80074 ACUTE HEPATITIS PANEL: CPT

## 2019-05-31 PROCEDURE — 86665 EPSTEIN-BARR CAPSID VCA: CPT

## 2019-05-31 PROCEDURE — 74177 CT ABD & PELVIS W/CONTRAST: CPT | Mod: 26

## 2019-05-31 PROCEDURE — 83880 ASSAY OF NATRIURETIC PEPTIDE: CPT

## 2019-05-31 PROCEDURE — 87631 RESP VIRUS 3-5 TARGETS: CPT

## 2019-05-31 PROCEDURE — 86666 EHRLICHIA ANTIBODY: CPT

## 2019-05-31 PROCEDURE — 87798 DETECT AGENT NOS DNA AMP: CPT

## 2019-05-31 PROCEDURE — 86757 RICKETTSIA ANTIBODY: CPT

## 2019-05-31 PROCEDURE — 85610 PROTHROMBIN TIME: CPT

## 2019-05-31 PROCEDURE — 86803 HEPATITIS C AB TEST: CPT

## 2019-05-31 PROCEDURE — 86663 EPSTEIN-BARR ANTIBODY: CPT

## 2019-05-31 PROCEDURE — 82728 ASSAY OF FERRITIN: CPT

## 2019-05-31 PROCEDURE — 99233 SBSQ HOSP IP/OBS HIGH 50: CPT

## 2019-05-31 PROCEDURE — 93005 ELECTROCARDIOGRAM TRACING: CPT

## 2019-05-31 PROCEDURE — 70480 CT ORBIT/EAR/FOSSA W/O DYE: CPT

## 2019-05-31 PROCEDURE — 86622 BRUCELLA ANTIBODY: CPT

## 2019-05-31 PROCEDURE — 99285 EMERGENCY DEPT VISIT HI MDM: CPT | Mod: 25

## 2019-05-31 PROCEDURE — 82150 ASSAY OF AMYLASE: CPT

## 2019-05-31 PROCEDURE — 85025 COMPLETE CBC W/AUTO DIFF WBC: CPT

## 2019-05-31 PROCEDURE — 85652 RBC SED RATE AUTOMATED: CPT

## 2019-05-31 PROCEDURE — 85379 FIBRIN DEGRADATION QUANT: CPT

## 2019-05-31 PROCEDURE — 82977 ASSAY OF GGT: CPT

## 2019-05-31 PROCEDURE — 80061 LIPID PANEL: CPT

## 2019-05-31 PROCEDURE — 93306 TTE W/DOPPLER COMPLETE: CPT

## 2019-05-31 PROCEDURE — 84165 PROTEIN E-PHORESIS SERUM: CPT

## 2019-05-31 PROCEDURE — 74177 CT ABD & PELVIS W/CONTRAST: CPT

## 2019-05-31 PROCEDURE — 84466 ASSAY OF TRANSFERRIN: CPT

## 2019-05-31 PROCEDURE — 82962 GLUCOSE BLOOD TEST: CPT

## 2019-05-31 PROCEDURE — 86664 EPSTEIN-BARR NUCLEAR ANTIGEN: CPT

## 2019-05-31 PROCEDURE — 80053 COMPREHEN METABOLIC PANEL: CPT

## 2019-05-31 PROCEDURE — 70496 CT ANGIOGRAPHY HEAD: CPT

## 2019-05-31 PROCEDURE — 70551 MRI BRAIN STEM W/O DYE: CPT

## 2019-05-31 PROCEDURE — 86618 LYME DISEASE ANTIBODY: CPT

## 2019-05-31 PROCEDURE — 86431 RHEUMATOID FACTOR QUANT: CPT

## 2019-05-31 PROCEDURE — 96375 TX/PRO/DX INJ NEW DRUG ADDON: CPT | Mod: XU

## 2019-05-31 PROCEDURE — 87581 M.PNEUMON DNA AMP PROBE: CPT

## 2019-05-31 PROCEDURE — 70450 CT HEAD/BRAIN W/O DYE: CPT

## 2019-05-31 PROCEDURE — 96361 HYDRATE IV INFUSION ADD-ON: CPT | Mod: XU

## 2019-05-31 PROCEDURE — 84443 ASSAY THYROID STIM HORMONE: CPT

## 2019-05-31 PROCEDURE — 86695 HERPES SIMPLEX TYPE 1 TEST: CPT

## 2019-05-31 PROCEDURE — 70498 CT ANGIOGRAPHY NECK: CPT

## 2019-05-31 PROCEDURE — 86645 CMV ANTIBODY IGM: CPT

## 2019-05-31 PROCEDURE — 82553 CREATINE MB FRACTION: CPT

## 2019-05-31 PROCEDURE — 85730 THROMBOPLASTIN TIME PARTIAL: CPT

## 2019-05-31 PROCEDURE — 84484 ASSAY OF TROPONIN QUANT: CPT

## 2019-05-31 PROCEDURE — 87591 N.GONORRHOEAE DNA AMP PROB: CPT

## 2019-05-31 PROCEDURE — 83036 HEMOGLOBIN GLYCOSYLATED A1C: CPT

## 2019-05-31 PROCEDURE — 80048 BASIC METABOLIC PNL TOTAL CA: CPT

## 2019-05-31 PROCEDURE — 86753 PROTOZOA ANTIBODY NOS: CPT

## 2019-05-31 PROCEDURE — 87491 CHLMYD TRACH DNA AMP PROBE: CPT

## 2019-05-31 PROCEDURE — 87040 BLOOD CULTURE FOR BACTERIA: CPT

## 2019-05-31 PROCEDURE — 83690 ASSAY OF LIPASE: CPT

## 2019-05-31 PROCEDURE — 86140 C-REACTIVE PROTEIN: CPT

## 2019-05-31 PROCEDURE — 83615 LACTATE (LD) (LDH) ENZYME: CPT

## 2019-05-31 PROCEDURE — 80307 DRUG TEST PRSMV CHEM ANLYZR: CPT

## 2019-05-31 PROCEDURE — 86780 TREPONEMA PALLIDUM: CPT

## 2019-05-31 PROCEDURE — 99232 SBSQ HOSP IP/OBS MODERATE 35: CPT | Mod: GC

## 2019-05-31 PROCEDURE — 80076 HEPATIC FUNCTION PANEL: CPT

## 2019-05-31 PROCEDURE — 87633 RESP VIRUS 12-25 TARGETS: CPT

## 2019-05-31 PROCEDURE — 84436 ASSAY OF TOTAL THYROXINE: CPT

## 2019-05-31 PROCEDURE — 83605 ASSAY OF LACTIC ACID: CPT

## 2019-05-31 PROCEDURE — 87389 HIV-1 AG W/HIV-1&-2 AB AG IA: CPT

## 2019-05-31 PROCEDURE — 93880 EXTRACRANIAL BILAT STUDY: CPT

## 2019-05-31 PROCEDURE — 36415 COLL VENOUS BLD VENIPUNCTURE: CPT

## 2019-05-31 PROCEDURE — 86720 LEPTOSPIRA ANTIBODY: CPT

## 2019-05-31 PROCEDURE — 84155 ASSAY OF PROTEIN SERUM: CPT

## 2019-05-31 RX ORDER — ATORVASTATIN CALCIUM 80 MG/1
1 TABLET, FILM COATED ORAL
Qty: 0 | Refills: 0 | DISCHARGE

## 2019-05-31 RX ORDER — ATORVASTATIN CALCIUM 80 MG/1
1 TABLET, FILM COATED ORAL
Qty: 30 | Refills: 0
Start: 2019-05-31

## 2019-05-31 RX ORDER — APIXABAN 2.5 MG/1
1 TABLET, FILM COATED ORAL
Qty: 60 | Refills: 0
Start: 2019-05-31 | End: 2019-06-29

## 2019-05-31 RX ORDER — APIXABAN 2.5 MG/1
5 TABLET, FILM COATED ORAL EVERY 12 HOURS
Refills: 0 | Status: DISCONTINUED | OUTPATIENT
Start: 2019-05-31 | End: 2019-05-31

## 2019-05-31 RX ORDER — IOHEXOL 300 MG/ML
30 INJECTION, SOLUTION INTRAVENOUS ONCE
Refills: 0 | Status: COMPLETED | OUTPATIENT
Start: 2019-05-31 | End: 2019-05-31

## 2019-05-31 RX ORDER — METOPROLOL TARTRATE 50 MG
1 TABLET ORAL
Qty: 30 | Refills: 0
Start: 2019-05-31 | End: 2019-06-29

## 2019-05-31 RX ADMIN — APIXABAN 5 MILLIGRAM(S): 2.5 TABLET, FILM COATED ORAL at 05:14

## 2019-05-31 RX ADMIN — Medication 81 MILLIGRAM(S): at 12:38

## 2019-05-31 RX ADMIN — Medication 1 SPRAY(S): at 05:18

## 2019-05-31 RX ADMIN — APIXABAN 5 MILLIGRAM(S): 2.5 TABLET, FILM COATED ORAL at 18:16

## 2019-05-31 RX ADMIN — PANTOPRAZOLE SODIUM 40 MILLIGRAM(S): 20 TABLET, DELAYED RELEASE ORAL at 05:14

## 2019-05-31 RX ADMIN — Medication 12.5 MILLIGRAM(S): at 18:16

## 2019-05-31 RX ADMIN — IOHEXOL 30 MILLILITER(S): 300 INJECTION, SOLUTION INTRAVENOUS at 14:04

## 2019-05-31 RX ADMIN — Medication 1 SPRAY(S): at 18:20

## 2019-05-31 RX ADMIN — Medication 12.5 MILLIGRAM(S): at 05:13

## 2019-05-31 NOTE — DISCHARGE NOTE PROVIDER - CARE PROVIDER_API CALL
Wong De Leon  [address],   [place] [address]  Phone: [phone]  Fax: (   )    -  Follow Up Time: Wong De Leon  [address],   [place] [address]  Phone: [phone]  Fax: (   )    -  Follow Up Time:     Emily Hernandez)  Infectious Disease; Internal Medicine  [address] [place] [address]  Phone: [phone]  Fax: [phone]  Follow Up Time: Emily Hernandez)  Infectious Disease; Internal Medicine  [address] [place] [address]  Phone: [phone]  Fax: [phone]  Follow Up Time:     Wong De Leon  [address],   [place] [address]  Phone: [phone]  Fax: (   )    -  Follow Up Time:     Deepali Nye)  Internal Medicine  [address] [id number]  Phone: [phone]  Fax: [phone]  Follow Up Time:

## 2019-05-31 NOTE — DISCHARGE NOTE PROVIDER - NSDCCPCAREPLAN_GEN_ALL_CORE_FT
PRINCIPAL DISCHARGE DIAGNOSIS  Diagnosis: Atrial fibrillation, unspecified type  Assessment and Plan of Treatment: You were found to have a an irregular heart rhythm. We started you on a medication to slow down your heart rate and gave you a medication to prevent you from developing blood clots. Please follow up with your primary care physcian for continued management of this condition.      SECONDARY DISCHARGE DIAGNOSES  Diagnosis: Reactive lymphadenopathy  Assessment and Plan of Treatment: We perfomed some blood work that showed some atypical blood cells. We scheduled an appointment with you primary  physician who will refer you to a Hematologist to further evaluate this. Please continue to follow up with your primary care phsycian for continued maanagement of this condition.    Diagnosis: Febrile illness, acute  Assessment and Plan of Treatment: You spiked a fever whilst you were in the hospital. We are not certain what caused the fever but we suspect that it may be related to an infection or underlying condition that has not been diagnosed. Please continue to follow up with your primary care physican for continued management of this condition and further evaluation. PRINCIPAL DISCHARGE DIAGNOSIS  Diagnosis: Atrial fibrillation, unspecified type  Assessment and Plan of Treatment: You were found to have a an irregular heart rhythm. We started you on a medication to slow down your heart rate and gave you a medication to prevent you from developing blood clots. Please follow up with your primary care physcian for continued management of this condition. You can also follow up with the cardiologist who has seen you in the hospital, Dr. Nye (contact information attached)      SECONDARY DISCHARGE DIAGNOSES  Diagnosis: Reactive lymphadenopathy  Assessment and Plan of Treatment: We perfomed some blood work that showed some atypical blood cells. We scheduled an appointment with you primary  physician who will refer you to a Hematologist to further evaluate this. [facility] Hematology group will also reach out to you on Monday Marie 3, 2019 for an appointment if you choose to follow up with us. Please continue to follow up with your primary care phsycian for continued maanagement of this condition.    Diagnosis: Febrile illness, acute  Assessment and Plan of Treatment: You spiked a fever whilst you were in the hospital. We are not certain what caused the fever but we suspect that it may be related to an infection or underlying condition that has not been diagnosed. Please follow up with Emily Morales (Infectious Diseases physician who has seen you in the hospital) and continue to follow up with your primary care physican. PRINCIPAL DISCHARGE DIAGNOSIS  Diagnosis: Atrial fibrillation, unspecified type  Assessment and Plan of Treatment: You were found to have a an irregular heart rhythm. We started you on a medication to slow down your heart rate and gave you a medication to prevent you from developing blood clots. Please follow up with your primary care physcian for continued management of this condition. You can also follow up with the cardiologist who has seen you in the hospital, Dr. Nye (contact information attached)      SECONDARY DISCHARGE DIAGNOSES  Diagnosis: Reactive lymphadenopathy  Assessment and Plan of Treatment: We perfomed some blood work that showed some atypical blood cells. We scheduled an appointment with you primary  physician who will refer you to a Hematologist to further evaluate this. [facility] Hematology group will also reach out to you on Monday Marie 3, 2019 for an appointment if you choose to follow up with us. Please continue to follow up with your primary care phsycian for continued maanagement of this condition.    Diagnosis: Febrile illness, acute  Assessment and Plan of Treatment: You spiked a fever whilst you were in the hospital. We are not certain what caused the fever but we suspect that it may be related to an infection or underlying condition that has not been diagnosed. Please follow up with Emily Morales (Infectious Diseases physician who has seen you in the hospital) and continue to follow up with your primary care physican.

## 2019-05-31 NOTE — PROGRESS NOTE ADULT - PROBLEM SELECTOR PLAN 1
Patient febrile to 102 on admission, with 3/4 SIRS criteria (temp, HR, bands), but with no known source. Patient denies any known fevers at home, but as he reports several weeks of being prescribed antibiotics for swollen LNs, sinus infections, and URI, this is concerning for prolonged fever and FUO. Workup done on this admission includes normal CT chest, negative UA, negative RVP, negative HIV, negative RF. CRP and ferritin elevated, but ESR normal. No evidence of acute sinus infection per ENT. Of note, per PCP, patient had negative Lyme recently. Patient has PSH of splenectomy, but reports receiving all necessary vaccinations. Differential includes occult infection, malignancy, or autoimmune process such as vasculitis.   - f/u blood cultures, no growth to date   - f/u tick-borne illness panel, RMSF   - f/u quantiferon, CPK, MICHAEL, SPEP, ANCA, EBV, CMV, HSV serologies   - CT A/P per ID recs  - ID consulted, f/u recs Patient febrile to 102 on admission, with 3/4 SIRS criteria (temp, HR, bands), but with no known source. Patient denies any known fevers at home, but as he reports several weeks of being prescribed antibiotics for swollen LNs, sinus infections, and URI, this is concerning for prolonged fever and FUO. Workup done on this admission includes normal CT chest, negative UA, negative RVP, negative HIV, negative RF. CRP and ferritin elevated, but ESR normal. No evidence of acute sinus infection per ENT. Of note, per PCP, patient had negative Lyme recently. Patient has PSH of splenectomy, but reports receiving all necessary vaccinations. Differential includes occult infection, malignancy, or autoimmune process such as vasculitis.   - f/u blood cultures, no growth to date   - f/u tick-borne illness panel, RMSF   - f/u quantiferon, CPK, MICHAEL, SPEP, ANCA, EBV, CMV, HSV serologies   - CT A/P per ID recs  - ID consulted, f/u recs    #Rule out Malignancy  - Patient noted with have fever of unknown origin with peripheral smear concerning for atypical lymphocytes concerning for possible lymphoma (reactive lymphocytes as per pathologist)  - Hematology consulted to evaluate findings. Patient febrile to 102 on admission, with 3/4 SIRS criteria (temp, HR, bands), but with no known source. Patient denies any known fevers at home, but as he reports several weeks of being prescribed antibiotics for swollen LNs, sinus infections, and URI, this is concerning for prolonged fever and FUO. Workup done on this admission includes normal CT chest, negative UA, negative RVP, negative HIV, negative RF. CRP and ferritin elevated, but ESR normal. No evidence of acute sinus infection per ENT. Of note, per PCP, patient had negative Lyme recently. Patient has PSH of splenectomy, but reports receiving all necessary vaccinations. Differential includes occult infection, malignancy, or autoimmune process such as vasculitis.   - f/u blood cultures, no growth to date   - f/u tick-borne illness panel, RMSF   - f/u quantiferon, CPK, MICHAEL, SPEP, ANCA, EBV, CMV, HSV serologies   - CT A/P per ID recs  - ID consulted, f/u recs    #Rule out Malignancy  - Patient noted with have fever of unknown origin with peripheral smear concerning for atypical lymphocytes concerning for possible lymphoma (reactive lymphocytes as per pathologist)  - Hematology consulted to evaluate findings  - Flow cytometry Patient febrile to 102 on admission, with 3/4 SIRS criteria (temp, HR, bands), but with no known source. Patient denies any known fevers at home, but as he reports several weeks of being prescribed antibiotics for swollen LNs, sinus infections, and URI, this is concerning for prolonged fever and FUO. Workup done on this admission includes normal CT chest, negative UA, negative RVP, negative HIV, negative RF. CRP and ferritin elevated, but ESR normal. No evidence of acute sinus infection per ENT. Of note, per PCP, patient had negative Lyme recently. Patient has PSH of splenectomy, but reports receiving all necessary vaccinations. Differential includes occult infection, malignancy, or autoimmune process such as vasculitis.   - f/u blood cultures, no growth to date   - f/u tick-borne illness panel, RMSF   - f/u quantiferon, CPK, MICHAEL, SPEP, ANCA, EBV, CMV, HSV serologies   - CT A/P per ID recs  - ID consulted, f/u recs    #Rule out Malignancy  - Patient noted with have fever of unknown origin with peripheral smear concerning for atypical lymphocytes vs BLASTS? concerning for possible lymphoma (reactive lymphocytes as per pathologist)  - Hematology consulted to evaluate findings  - Flow cytometry

## 2019-05-31 NOTE — PROGRESS NOTE ADULT - ASSESSMENT
64yo M with PMH of HLD and GERD, who presented to [facility] 5/28 with two days of headache, dizziness, and weakness, found to be in rapid AFib and admitted to [facility] 5 Uris for further management, now being transferred to medicine for further workup of fever with unknown source. 63M PMH HLD, GERD, who presented to [facility] 5/28 with two days of headache, dizziness, and weakness, found to be in rapid AFib and admitted to [facility] 5 [facility]ris for further management, transferred to medicine service after spiking fever with unknown source.

## 2019-05-31 NOTE — DIETITIAN INITIAL EVALUATION ADULT. - ENERGY NEEDS
Ht:5ft 8inches,IBW:154lbs +/-10%,122% of IBW.BMI:28.6.Adjusted for overweight and increased fluids for FUO

## 2019-05-31 NOTE — PROGRESS NOTE ADULT - ATTENDING COMMENTS
I have reviewed the medical record, including laboratory and radiographic studies, interviewed and examined the patient and discussed the plan with Dr. Weaver, the ID Resident.  Agree with above. Please recall if further ID input is desired – ID Team 1.

## 2019-05-31 NOTE — DISCHARGE NOTE PROVIDER - NSDCFUADDAPPT_GEN_ALL_CORE_FT
Please follow up with your primary care physician on Monday June 3rd @3:45pm for a post hospitalization follow up.

## 2019-05-31 NOTE — PROGRESS NOTE ADULT - SUBJECTIVE AND OBJECTIVE BOX
ID CONSULT PROGRESS NOTE:    SUBJECTIVE:  Pt seen and examined at bedside. Laying comfortably in bed. NAEO. Pt without fevers/chills or new symptoms evolving. Denies acute complaints.    REVIEW OF SYSTEMS:  Negative except for above.       MEDICATIONS  (STANDING):  apixaban 5 milliGRAM(s) Oral every 12 hours  aspirin enteric coated 81 milliGRAM(s) Oral daily  atorvastatin 20 milliGRAM(s) Oral at bedtime  fluticasone propionate 50 MICROgram(s)/spray Nasal Spray 1 Spray(s) Both Nostrils two times a day  iohexol 300 mG (iodine)/mL Oral Solution 30 milliLiter(s) Oral once  metoprolol tartrate 12.5 milliGRAM(s) Oral two times a day  pantoprazole    Tablet 40 milliGRAM(s) Oral before breakfast    MEDICATIONS  (PRN):  acetaminophen   Tablet .. 650 milliGRAM(s) Oral every 6 hours PRN Temp greater or equal to 38C (100.4F), Mild Pain (1 - 3)      Allergies    No Known Allergies    Intolerances        Vital Signs Last 24 Hrs  T(C): 36.4 (31 May 2019 05:05), Max: 37.2 (30 May 2019 10:15)  T(F): 97.6 (31 May 2019 05:05), Max: 98.9 (30 May 2019 10:15)  HR: 67 (31 May 2019 05:05) (67 - 81)  BP: 129/81 (31 May 2019 05:05) (108/72 - 134/86)  BP(mean): --  RR: 16 (31 May 2019 05:05) (16 - 19)  SpO2: 95% (31 May 2019 05:05) (95% - 97%)    PHYSICAL EXAM:  Constitutional: NAD. Well-developed, well nourished  HEENT: Conjunctiva clear, no oral lesion, no sinus tenderness on percussion	  Neck: no JVD, no lymphadenopathy, supple  Respiratory: b/l fine crackles to mid lung fields  Cardiovascular: RRR with no murmurs  Gastrointestinal:soft, (+) BS, no HSM, nondistened, nonrigid.  Extremities: LE WWP b/l. No LE edema b/l.   Vascular: 2+ DP pulses b/l    LABS                        15.1   10.65 )-----------( 154      ( 31 May 2019 06:40 )             45.9     05-31    142  |  102  |  11  ----------------------------<  104<H>  4.3   |  30  |  0.75    Ca    9.5      31 May 2019 06:40  Mg     1.9     05-31    TPro  5.8<L>  /  Alb  x   /  TBili  x   /  DBili  x   /  AST  x   /  ALT  x   /  AlkPhos  x   05-30    PTT - ( 30 May 2019 06:40 )  PTT:133.0 sec      MICROBIOLOGY:  Culture - Blood (05.29.19 @ 02:51)    Specimen Source: .Blood Blood    Culture Results:   No growth to date.    Culture - Blood (05.29.19 @ 02:51)    Specimen Source: .Blood Blood    Culture Results:   No growth to date.      Markie-Barr Virus Serologic Test (05.30.19 @ 11:45)    EBV EA Ab EIA: 32.4 U/mL    EBV VCA IgG EIA: >750.0 U/mL    EBV VCA IgM EIA: <10.0 U/mL    EBV Interpretation: See Note: INTERPRETATION OF MARKIE BARR VIRUS (EBV) ANTIBODY RESULTS  EBV VCA IGG AB EBV NA IGG AB EBV VCA IGM AB EBV EA IGG AB Diagnosis  NEG NEG NEG NEG EBV Sero-negative  NEG NEG POS NEG Suspected primary infection (Early Phase)  POS NEG POS POS/NEG Past EBV infection ( Convalescence)  POS POS NEG POS/NEG Past EBV infection  POS POS POS/NEG POS Reactivated Infection    HIV-1/2 Antigen/Antibody Screen by [facility] (05.30.19 @ 07:37)    HIV-1/2 Combo Result: Nonreact: The HIV Ag/Ab Combo test performed screens for HIV-1 p24 antigen,  antibodies to HIV-1 (group M and group O), and antibodies to HIV-2. All  specimens repeatedly reactive will reflex to an HIV 1/2 antibody  confirmation and differentiation test. This assay detects p24 antigen  which may be present prior to the development of HIV antibodies,  therefore a reactive result with a negative HIV 1/2 AB Confirmation  should be followed up with HIV-1 RNA, HIV-2 RNA and repeat testing in 4-8  weeks. A nonreactive result does not preclude previous exposure to or  infection with HIV-1 or HIV-2. [facility] prohibits disclosure of this  result to any unauthorized party.      RADIOLOGY & ADDITIONAL STUDIES:    reviewed

## 2019-05-31 NOTE — DIETITIAN INITIAL EVALUATION ADULT. - OTHER INFO
64y/o male admitted with HA/dizziness and weakness and fevers. Found to have AFib.. Eating 100%.No N/V/d OR pain reported.Skin intact..Reported he avoids increased salt and fatty foods at home.

## 2019-05-31 NOTE — PROGRESS NOTE ADULT - REASON FOR ADMISSION
headache, dizziness and weakness x 3 days.
atrial fibrillation
headache, dizziness and weakness x 3 days.
headache, dizziness and weakness x 3 days.

## 2019-05-31 NOTE — PROGRESS NOTE ADULT - ASSESSMENT
64yo M with PMH of HLD and GERD, s/p splenectomy, who presented to [facility] 5/28 with two days of headache, dizziness, and weakness, found to be in rapid AFib and admitted to [facility] 5 [facility] for further management, s/p rate control and now c/b persistent fevers despite ABX and negative w/u for infection, transferred to medicine for w/u of FUO.Etiology of fevers remain unclear. Pt lives [facility] which is endemic to tick-born diseases which may manifest with high fevers and mildly elevated LFTs. In addition, cannot rule out underlying autoimmune d/o vs. immunocompromised state given asplenia. In addition multiple viruses may cause similar presentation. ESR normal which is points against vasculitis though is a part of the differential. Pt with otherwise normal imaging of CTH, CTA h/n, CT chest. URI symptoms improved with ENT low suspicion for such symptoms to be etiology of fevers. Given acute bandemia and neutropenia on admission, more of septic presentation rather than malignancy as reason for fevers. Now with EBV positive titers but suspicion for tick-born or zoologic diseases remains high. 62yo M with PMH of HLD and GERD, s/p splenectomy, who presented to [facility] 5/28 with two days of headache, dizziness, and weakness, found to be in rapid AFib and admitted to [facility] 5 Uris for further management, s/p rate control and now c/b persistent fevers despite ABX and negative w/u for infection, transferred to medicine for w/u of FUO.Etiology of fevers remain unclear. Pt lives [facility] which is endemic to tick-born diseases which may manifest with high fevers and mildly elevated LFTs. In addition, cannot rule out underlying autoimmune d/o vs. immunocompromised state given asplenia. In addition multiple viruses may cause similar presentation. ESR normal which is points against vasculitis though is a part of the differential. Pt with otherwise normal imaging of CTH, CTA h/n, CT chest. URI symptoms improved with ENT low suspicion for such symptoms to be etiology of fevers. Given acute bandemia and neutropenia on admission, more of septic presentation rather than malignancy as reason for fevers.     -EBV titers showing previous infection and no current infection  -awaiting serologies for tick born panel, leptospirosis, coxiella, brucella, chlamydia, HSV  -f/u MICHAEL, ANCA, quantiferon  -Bl cx 5/30 NGTD   -f/u CTAP  -ID team 1 to follow 62yo M with PMH of HLD and GERD, s/p splenectomy, who presented to [facility] 5/28 with two days of headache, dizziness, and weakness, found to be in rapid AFib and admitted to [facility] 5 Uris for further management, s/p rate control and now c/b persistent fevers despite ABX and negative w/u for infection, transferred to medicine for w/u of FUO.Etiology of fevers remain unclear. Pt lives [facility] which is endemic to tick-born diseases which may manifest with high fevers and mildly elevated LFTs. In addition, cannot rule out underlying autoimmune d/o vs. immunocompromised state given asplenia. In addition multiple viruses may cause similar presentation. ESR normal which is points against vasculitis though is a part of the differential. Pt with otherwise normal imaging of CTH, CTA h/n, CT chest. URI symptoms improved with ENT low suspicion for such symptoms to be etiology of fevers. Given acute bandemia and neutropenia on admission, more of septic presentation rather than malignancy as reason for fevers.     -EBV titers showing previous infection and no current infection  -awaiting serologies for tick born panel, leptospirosis, coxiella, brucella, chlamydia, HSV  -f/u MICHAEL, ANCA, quantiferon  -Bl cx 5/30 NGTD   -f/u CTAP  -patient can follow up with Dr. Hernandez outpatient to review results  -discussed with primary team  -please reconsult if need further ID input

## 2019-05-31 NOTE — PROGRESS NOTE ADULT - PROBLEM SELECTOR PLAN 4
Mild, possibly related to underlying infection vs inflammatory process vs liver dysfunction. No signs or symptoms of bleeding.  - monitor CBC
D-dimer 5062  - CT Angio Chest negative for PE and LE duplex negative for DVT
D-dimer 5062  - CT Angio Chest negative for PE and LE duplex negative for DVT
Mild, possibly related to underlying infection vs inflammatory process vs liver dysfunction. No signs or symptoms of bleeding.  - monitor CBC

## 2019-05-31 NOTE — PROGRESS NOTE ADULT - PROBLEM SELECTOR PLAN 3
Patient presented with two days of dizziness, which has improved but is persistent. Neuro exam intact. CT head with right parieto-occipital encephalomalacia and gliosis likely sequelae of prior infarct and/or contusion.  - Dr. Collins consulted by cardiology, f/u recs  - MRI, carotid Dopplers per Dr. Collins Patient presented with two days of dizziness, which has improved but is persistent. Neuro exam intact. CT head with right parieto-occipital encephalomalacia and gliosis likely sequelae of prior infarct and/or contusion.  - Dr. Collins consulted by cardiology, f/u recs  - MRI - No MR evidence of recent infarction. Prior occipital lobes infarcts   (right more than left) and few small chronic left cerebellar infarcts.   - f/u carotid Dopplers per Dr. Collins

## 2019-05-31 NOTE — DISCHARGE NOTE PROVIDER - HOSPITAL COURSE
62yo M with PMH of HLD, GERD, s/p splenectomy, who presented to [facility] 5/28 with two days of headache, dizziness, and weakness, found to be in rapid AFib. Medically optimized with Lopressor and Eliquis . On admission, patient also noted to be febrile to 102, meeting sepsis criteria with elevated temp, HR, and 30% bands. Labs significant for elevated AST, ALT, and GGT, as well as elevated ferritin and CRP with normal ESR. Source unknown, CXR clear, UA negative. Of note, per patient and PCP, he has intermittently been on antibiotics for presumed sinus infection since 01/2019, most recently prescribed Augmentin for URI. Neurology consulted recommended MRI that showed No  evidence of recent infarction. Prior occipital lobes infarcts (right more than left) and few small chronic left cerebellar infarct. Peripheral smear performed concerning for possible malignancy, primary care physician made aware of results, endorsed that he would follow with the patient and schedule a Hematologist referral for continued evaluation. Goals of management discussed with patient, verbalized understanding, deemed stable for discharge home with outpatient follow up. 62yo M with PMH of HLD, GERD, s/p splenectomy, who presented to [facility] 5/28 with two days of headache, dizziness, and weakness, found to be in rapid AFib. Medically optimized with Lopressor and Eliquis . On admission, patient also noted to be febrile to 102, meeting sepsis criteria with elevated temp, HR, and 30% bands. Labs significant for elevated AST, ALT, and GGT, as well as elevated ferritin and CRP with normal ESR. Source unknown, CXR clear, UA negative, CT head, neck,, temporal, chest, abdomen and pelvis negative for any source of infection. Of note, per patient and PCP, he has intermittently been on antibiotics for presumed sinus infection since 01/2019, most recently prescribed Augmentin for URI. Neurology consulted recommended MRI that showed no  evidence of recent infarction, prior occipital lobes infarcts (right more than left) and few small chronic left cerebellar infarct. Peripheral smear performed showed abnormal lymphocytes concerning for possible malignancy, primary care physician made aware of results, endorsed that he would follow with the patient and schedule a Hematologist referral for continued evaluation. Goals of management discussed with patient, verbalized understanding, deemed stable for discharge home with outpatient follow up. 62yo M with PMH of HLD, GERD, s/p splenectomy, who presented to [facility] 5/28 with two days of headache, dizziness, and weakness, found to be in rapid AFib. Medically optimized with Lopressor and Eliquis . On admission, patient also noted to be febrile to 102, meeting sepsis criteria with elevated temp, HR, and 30% bands. Labs significant for elevated AST, ALT, and GGT, as well as elevated ferritin and CRP with normal ESR. Source unknown, CXR clear, UA negative, CT head, neck,, temporal, chest, abdomen and pelvis negative for any source of infection. Of note, per patient and PCP, he has intermittently been on antibiotics for presumed sinus infection since 01/2019, most recently prescribed Augmentin for URI. Neurology consulted recommended MRI that showed no  evidence of recent infarction, prior occipital lobes infarcts (right more than left) and few small chronic left cerebellar infarct. Peripheral smear performed showed abnormal lymphocytes concerning for possible malignancy, primary care physician made aware of results, endorsed that he would follow with the patient and schedule a Hematologist referral for continued evaluation. Goals of management discussed with patient, verbalized understanding, deemed stable for discharge home with outpatient follow up.                  ATTENDING ADDENDUM    discharge diagnosis        fevers-- suspect viral syndrome    sepsis (POA)    subacute cerebellar stroke    afib w/ RVR    transaminitits    hx of splenectomy

## 2019-05-31 NOTE — DIETITIAN INITIAL EVALUATION ADULT. - PROBLEM SELECTOR PLAN 1
initial EKG@[facility] revealed Afib@116BPM without acute changes. On arrival to [facility] EKG revealed NSR@81BPM.  --will start Metoprolol Tartrate 12.5mg PO BID and continue to monitor on cardiac telemetry.  --Chadvasc score 0.   --obtain official Echocardiogram in AM.

## 2019-05-31 NOTE — DISCHARGE NOTE PROVIDER - PROVIDER TOKENS
FREE:[LAST:[Moises],FIRST:[Wong],PHONE:[phone]],FAX:[(   )    -],ADDRESS:[[address],   [place] [address]]] FREE:[LAST:[Moises],FIRST:[Wong],PHONE:[phone]],FAX:[(   )    -],ADDRESS:[[address], [place] [address]]],PROVIDER:[TOKEN:[[id number]]] PROVIDER:[TOKEN:[[id number]]],FREE:[LAST:[De Leon],FIRST:[Wong],PHONE:[phone]],FAX:[(   )    -],ADDRESS:[[address] [place] [address]]],PROVIDER:[TOKEN:[[id number]]]

## 2019-05-31 NOTE — PROGRESS NOTE ADULT - SUBJECTIVE AND OBJECTIVE BOX
INTERVAL HPI/OVERNIGHT EVENTS: SWETHA    SUBJECTIVE: Patient seen and examined at bedside. NO complaints overnight, denies fever, nausea, vomiting, chest pain, cough, SOB, abdominal pain, changes in mental status.    OBJECTIVE:    VITAL SIGNS:  ICU Vital Signs Last 24 Hrs  T(C): 36.4 (31 May 2019 05:05), Max: 36.7 (30 May 2019 17:20)  T(F): 97.6 (31 May 2019 05:05), Max: 98.1 (30 May 2019 17:20)  HR: 67 (31 May 2019 05:05) (67 - 74)  BP: 129/81 (31 May 2019 05:05) (108/72 - 134/86)  RR: 16 (31 May 2019 05:05) (16 - 19)  SpO2: 95% (31 May 2019 05:05) (95% - 97%)        05-30 @ 07:01  -  05-31 @ 07:00  --------------------------------------------------------  IN: 518 mL / OUT: 0 mL / NET: 518 mL      CAPILLARY BLOOD GLUCOSE            PHYSICAL EXAM:    General: Middle aged male, sitting up in bed in NAD   HEENT: NCAT, PERRL, EOMI, no conjunctival pallor or scleral icterus, MMM, no oropharyngeal/tonsillar exudates or vesicles  Neck: supple, no JVD, no lymphadenopathy  Respiratory: no increased work of breathing, CTAB, no w/r/r   Cardiovascular: RRR, normal S1/S2, no m/r/g   Abdominal: soft, NTND, +BS  Extremities: WWP, no cyanosis, clubbing or edema  Vascular: radial pulses and DP 2+ bilaterally   Neurological: AAOx3, no CN deficits, no nystagmus, strength 5/5 in all extremities, sensation intact throughout  Skin: no gross skin abnormalities or rashes    MEDICATIONS:  MEDICATIONS  (STANDING):  apixaban 5 milliGRAM(s) Oral every 12 hours  atorvastatin 20 milliGRAM(s) Oral at bedtime  fluticasone propionate 50 MICROgram(s)/spray Nasal Spray 1 Spray(s) Both Nostrils two times a day  metoprolol tartrate 12.5 milliGRAM(s) Oral two times a day  pantoprazole    Tablet 40 milliGRAM(s) Oral before breakfast    MEDICATIONS  (PRN):  acetaminophen   Tablet .. 650 milliGRAM(s) Oral every 6 hours PRN Temp greater or equal to 38C (100.4F), Mild Pain (1 - 3)      ALLERGIES:  Allergies    No Known Allergies    Intolerances        LABS:                        15.1   10.65 )-----------( 154      ( 31 May 2019 06:40 )             45.9     05-31    142  |  102  |  11  ----------------------------<  104<H>  4.3   |  30  |  0.75    Ca    9.5      31 May 2019 06:40  Mg     1.9     05-31    TPro  6.8  /  Alb  3.8  /  TBili  0.3  /  DBili  <0.2  /  AST  96<H>  /  ALT  113<H>  /  AlkPhos  150<H>  05-31    LIVER FUNCTIONS - ( 31 May 2019 06:40 )  Alb: 3.8 g/dL / Pro: 6.8 g/dL / ALK PHOS: 150 U/L / ALT: 113 U/L / AST: 96 U/L / GGT: x           PTT - ( 30 May 2019 06:40 )  PTT:133.0 sec          RADIOLOGY & ADDITIONAL TESTS: Reviewed.

## 2019-05-31 NOTE — DISCHARGE NOTE NURSING/CASE MANAGEMENT/SOCIAL WORK - NSDCDPATPORTLINK_GEN_ALL_CORE
You can access the [facility] Patient Portal, offered by [facility], by registering with the following website: [email or web address]

## 2019-05-31 NOTE — PROGRESS NOTE ADULT - PROBLEM SELECTOR PROBLEM 1
Atrial fibrillation, unspecified type
Atrial fibrillation, unspecified type
Fever of undetermined origin
Fever of undetermined origin

## 2019-05-31 NOTE — PROGRESS NOTE ADULT - PROBLEM SELECTOR PLAN 2
Patient found to be in AFib with HR 110s on arrival to [facility], no known history of AFib, suspect paroxysmal 2/2 fever. Patient started on Lopressor on 5 Uris and converted to NSR. He was initially on heparin drip and transitioned to Eliquis on 5/30. CHADsVASC 0 at this time. Echo without any valvular disease or dysfunction   - continue metoprolol tartrate 12.5mg BID   - continue ASA 81mg qd  - consider d/c Eliquis as CHADsVASC 0 Patient found to be in AFib with HR 110s on arrival to [facility], no known history of AFib, suspect paroxysmal 2/2 fever. Patient started on Lopressor on 5 Uris and converted to NSR. He was initially on heparin drip and transitioned to Eliquis on 5/30. CHADsVASC 2 (stoke identified on MRI) at this time. Echo without any valvular disease or dysfunction   - continue metoprolol tartrate 12.5mg BID   - continue ASA 81mg qd  - Eliquis for AC as CHADsVASC 2 with noted past CVA on MRI

## 2019-05-31 NOTE — PROGRESS NOTE ADULT - PROBLEM SELECTOR PLAN 8
1) PCP Contacted on Admission: (Y/N) --> Dr. De Leon  2) Date of Contact with PCP:  3) PCP Contacted at Discharge: (Y/N)  4) Summary of Handoff Given to PCP:   5) Post-Discharge Appointment Date and Location:
1) PCP Contacted on Admission: (Y/N) --> Dr. De Leon  2) Date of Contact with PCP:  3) PCP Contacted at Discharge: (Y/N)  4) Summary of Handoff Given to PCP:   5) Post-Discharge Appointment Date and Location:

## 2019-05-31 NOTE — DISCHARGE NOTE PROVIDER - CARE PROVIDERS DIRECT ADDRESSES
,DirectAddress_Unknown ,DirectAddress_Unknown,[email or web address] ,[email or web address],DirectAddress_Unknown,[email or web address]

## 2019-05-31 NOTE — PROGRESS NOTE ADULT - PROBLEM SELECTOR PLAN 7
F: none  E: replete PRN  N: DASH/TLC  DVT ppx: Eliquis    Full Code    Dispo: [facility]
F: none  E: replete PRN  N: DASH/TLC  DVT ppx: Eliquis    Full Code    Dispo: [facility]

## 2019-05-31 NOTE — PROGRESS NOTE ADULT - PROVIDER SPECIALTY LIST ADULT
Cardiology
Infectious Disease
Internal Medicine
Internal Medicine
Intervent Cardiology
Internal Medicine

## 2019-06-01 LAB
GAMMA INTERFERON BACKGROUND BLD IA-ACNC: 0.39 IU/ML — SIGNIFICANT CHANGE UP
M TB IFN-G BLD-IMP: NEGATIVE — SIGNIFICANT CHANGE UP
M TB IFN-G CD4+ BCKGRND COR BLD-ACNC: 0.07 IU/ML — SIGNIFICANT CHANGE UP
M TB IFN-G CD4+CD8+ BCKGRND COR BLD-ACNC: 0.04 IU/ML — SIGNIFICANT CHANGE UP
QUANT TB PLUS MITOGEN MINUS NIL: 8.18 IU/ML — SIGNIFICANT CHANGE UP

## 2019-06-02 LAB
A PHAGOCYTOPH IGG TITR SER IF: SIGNIFICANT CHANGE UP TITER
B BURGDOR AB SER QL IA: NEGATIVE — SIGNIFICANT CHANGE UP
B MICROTI IGG TITR SER: SIGNIFICANT CHANGE UP TITER
E CHAFFEENSIS IGG TITR SER IF: SIGNIFICANT CHANGE UP TITER

## 2019-06-03 ENCOUNTER — CHART COPY (OUTPATIENT)
Age: 64
End: 2019-06-03

## 2019-06-03 PROBLEM — Z00.00 ENCOUNTER FOR PREVENTIVE HEALTH EXAMINATION: Status: ACTIVE | Noted: 2019-06-03

## 2019-06-03 LAB
BRUCELLA IGG SER QL IA: NEGATIVE — SIGNIFICANT CHANGE UP
BRUCELLA IGG+IGM SER-IMP: SIGNIFICANT CHANGE UP
BRUCELLA IGM SER QL IA: NEGATIVE — SIGNIFICANT CHANGE UP
CULTURE RESULTS: SIGNIFICANT CHANGE UP
CULTURE RESULTS: SIGNIFICANT CHANGE UP
HEMATOPATHOLOGY REPORT: SIGNIFICANT CHANGE UP
HSV1 AB FLD QL: SIGNIFICANT CHANGE UP TITER
HSV2 AB FLD-ACNC: SIGNIFICANT CHANGE UP TITER
LEPTOSPIRA AB TITR SER: NEGATIVE — SIGNIFICANT CHANGE UP
SPECIMEN SOURCE: SIGNIFICANT CHANGE UP
SPECIMEN SOURCE: SIGNIFICANT CHANGE UP

## 2019-06-04 PROBLEM — Z78.9 OTHER SPECIFIED HEALTH STATUS: Chronic | Status: ACTIVE | Noted: 2019-05-28

## 2019-06-04 LAB
R RICKETTSI AB SER-ACNC: NEGATIVE — SIGNIFICANT CHANGE UP
R RICKETTSI IGM SER-ACNC: 2.16 INDEX — HIGH (ref 0–0.89)
RICK SF IGG TITR SER IF: NEGATIVE — SIGNIFICANT CHANGE UP
RICK SF IGM TITR SER IF: 2.16 INDEX — HIGH (ref 0–0.89)

## 2019-06-10 ENCOUNTER — INBOUND DOCUMENT (OUTPATIENT)
Age: 64
End: 2019-06-10

## 2019-06-11 ENCOUNTER — INBOUND DOCUMENT (OUTPATIENT)
Age: 64
End: 2019-06-11

## 2019-06-11 DIAGNOSIS — D69.6 THROMBOCYTOPENIA, UNSPECIFIED: ICD-10-CM

## 2019-06-11 DIAGNOSIS — R59.9 ENLARGED LYMPH NODES, UNSPECIFIED: ICD-10-CM

## 2019-06-11 DIAGNOSIS — G46.4 CEREBELLAR STROKE SYNDROME: ICD-10-CM

## 2019-06-11 DIAGNOSIS — R94.5 ABNORMAL RESULTS OF LIVER FUNCTION STUDIES: ICD-10-CM

## 2019-06-11 DIAGNOSIS — A41.89 OTHER SPECIFIED SEPSIS: ICD-10-CM

## 2019-06-11 DIAGNOSIS — R42 DIZZINESS AND GIDDINESS: ICD-10-CM

## 2019-06-11 DIAGNOSIS — I69.398 OTHER SEQUELAE OF CEREBRAL INFARCTION: ICD-10-CM

## 2019-06-11 DIAGNOSIS — I48.91 UNSPECIFIED ATRIAL FIBRILLATION: ICD-10-CM

## 2019-06-11 DIAGNOSIS — G93.89 OTHER SPECIFIED DISORDERS OF BRAIN: ICD-10-CM

## 2019-06-11 DIAGNOSIS — Z90.81 ACQUIRED ABSENCE OF SPLEEN: ICD-10-CM

## 2019-06-11 DIAGNOSIS — R79.89 OTHER SPECIFIED ABNORMAL FINDINGS OF BLOOD CHEMISTRY: ICD-10-CM

## 2019-06-11 DIAGNOSIS — R50.9 FEVER, UNSPECIFIED: ICD-10-CM

## 2019-06-11 DIAGNOSIS — B34.9 VIRAL INFECTION, UNSPECIFIED: ICD-10-CM

## 2019-06-11 DIAGNOSIS — K21.9 GASTRO-ESOPHAGEAL REFLUX DISEASE WITHOUT ESOPHAGITIS: ICD-10-CM

## 2019-06-11 DIAGNOSIS — R74.0 NONSPECIFIC ELEVATION OF LEVELS OF TRANSAMINASE AND LACTIC ACID DEHYDROGENASE [LDH]: ICD-10-CM

## 2019-06-11 DIAGNOSIS — E78.5 HYPERLIPIDEMIA, UNSPECIFIED: ICD-10-CM

## 2019-06-21 ENCOUNTER — APPOINTMENT (OUTPATIENT)
Dept: INFECTIOUS DISEASE | Facility: CLINIC | Age: 64
End: 2019-06-21

## 2021-06-07 NOTE — ED ADULT NURSE NOTE - NSFALLRSKHARMRISK_ED_ALL_ED
Monitor and record your BP daily at home. Bring your BP numbers with you to your f/u appointment w/ Dr. Linares.  Monitor and record your weight everyday or every other day. Bring your weights with you to your follow up appointment with your pcp and/or Dr. Garza. If your weight increases by 4 pounds please call your pcp or dr. garza about restarting your diuretic.  Wear compression stockings at home.  Elevate your left leg as much as possible.Monitor and record your BP daily at home. Bring your BP numbers with you to your f/u appointment w/ Dr. Linares.    
no

## 2021-09-20 NOTE — DIETITIAN INITIAL EVALUATION ADULT. - PATIENT PROFILE REVIEWED
Soak right great toe in warm soapy water for 5 minutes twice a day for the next 3-5 days, Neosporin daily. ibuprofen or Tylenol as needed, ice pack as needed, antibiotic as prescribed, monitor for worsening infection as discussed during your visit. Red flags and ER precautions reviewed. Please refer to the patient education material for further information and guidance. Please follow up with your primary care provider in 3-5 days.           Patient Education     Paronychia of the Finger or Toe  Paronychia is an infection near a fingernail or toenail. It usually occurs when an opening in the cuticle or an ingrown toenail lets bacteria under the skin.   The infection will need to be drained if pus is present. If the infection has been caught early, you may need only antibiotic treatment. Healing will take about 1 to 2 weeks.   Home care  Follow these guidelines when caring for yourself at home:   · Clean and soak the toe or finger. Do this 2 times a day for the first 3 days. To do so:  ? Soak your foot or hand in a tub of warm water for 5 minutes. Or hold your toe or finger under a faucet of warm running water for 5 minutes.  ? Clean any crust away with soap and water using a cotton swab.  ? Put antibiotic ointment on the infected area.  · Change the dressing daily or any time it gets dirty.  · If you were given antibiotics, take them as directed until they are all gone.  · If your infection is on a toe, wear comfortable shoes with a lot of toe room. You can also wear open-toed sandals while your toe heals.  · You may use over-the-counter medicine (acetaminophen or ibuprofen) to help with pain, unless another medicine was prescribed. If you have chronic liver or kidney disease, talk with your healthcare provider before using these medicines. Also talk with your provider if you've had a stomach ulcer or gastrointestinal bleeding.  Prevention  The following can prevent paronychia:   · Don't cut or play with your  cuticles at home. A healthy cuticle maintains a seal between your skin and nail and keeps out infection.  · Don't bite your nails.  · Don't suck on your thumbs or fingers.  Follow-up care  Follow up with your healthcare provider, or as advised.   When to seek medical advice  Call your healthcare provider right away if any of these occur:   · Redness, pain, or swelling of the finger or toe gets worse  · You have trouble moving or bending the finger or toe  · Red streaks in the skin leading away from the wound  · Pus or fluid draining from the nail area  · Fever of 100.4ºF (38ºC) or higher, or as directed by your provider  Josué last reviewed this educational content on 7/1/2019 © 2000-2021 [employer]. All rights reserved. This information is not intended as a substitute for professional medical care. Always follow your healthcare professional's instructions.           Patient Education     Cellulitis  Cellulitis is an infection of the deep layers of skin. A break in the skin, such as a cut or scratch, can let bacteria under the skin. If the bacteria get to deep layers of the skin, it can be serious. If not treated, cellulitis can get into the bloodstream and lymph nodes. The infection can then spread throughout the body. This causes serious illness.   Cellulitis causes the affected skin to become red, swollen, warm, and sore. The reddened areas have a visible border. An open sore may leak fluid (pus). You may have a fever, chills, and pain.   Cellulitis is treated with antibiotics taken for 7 to 10 days. An open sore may be cleaned and covered with cool wet gauze. Symptoms should get better 1 to 2 days after treatment is started. Make sure to take all the antibiotics for the full number of days until they are gone. Keep taking the medicine even if your symptoms go away.   Home care  Follow these tips:  · Limit the use of the part of your body with cellulitis.   · If the infection is on your leg,  keep your leg raised while sitting. This helps reduce swelling.  · Take all of the antibiotic medicine exactly as directed until it is gone. Don't miss any doses, especially during the first 7 days. Don’t stop taking the medicine when your symptoms get better.  · Keep the affected area clean and dry.  · Wash your hands with soap and clean, running water before and after touching your skin. Anyone else who touches your skin should also wash his or her hands. Don't share towels.  Follow-up care  Follow up with your healthcare provider, or as advised. If your infection doesn't go away on the first antibiotic, your healthcare provider will prescribe a different one.   When to seek medical advice  Call your healthcare provider right away if any of these occur:  · Red areas that spread  · Swelling or pain that gets worse  · Fluid leaking from the skin (pus)  · Fever higher of 100.4º F (38.0º C) or higher after 2 days on antibiotics  Josué last reviewed this educational content on 8/1/2019 © 2000-2021 [employer]. All rights reserved. This information is not intended as a substitute for professional medical care. Always follow your healthcare professional's instructions.            yes

## 2022-04-01 NOTE — H&P ADULT - PROBLEM SELECTOR PLAN 1
Speech Therapy    Patient not seen in therapy today.    Unavailable due to medical tests/procedures.      Re-attempt plan: tomorrow    Additional details:  Pt NPO for procedure.          OBJECTIVE                  Documented in the chart in the following areas: Assessment.      Therapy procedure time and total treatment time can be found documented on the Time Entry flowsheet   initial [email or web address] revealed [email or web address] without acute changes. On arrival to [facility] EKG revealed [email or web address].  --will start Metoprolol Tartrate 12.5mg PO BID and continue to monitor on cardiac telemetry.  --Chadvasc score 0.   --obtain official Echocardiogram in AM.

## 2023-08-28 NOTE — ED PROVIDER NOTE - ENMT NEGATIVE STATEMENT, MLM
- I will call with your results as they come in this week  - Start the sertraline at bedtime to see if this helps  - Follow up in 2 weeks to reassess your symptoms and weight loss   Ears: no ear pain and no hearing problems.Nose: no nasal congestion and no nasal drainage.Mouth/Throat: no dysphagia, no hoarseness and no throat pain.Neck: no lumps, no pain, no stiffness and no swollen glands.

## 2023-12-11 NOTE — CONSULT NOTE ADULT - SUBJECTIVE AND OBJECTIVE BOX
ENT Consult Response    63M with fevers of unknown origin, complaining of URI sxs about 10 days ago. Describes nasal congestion, but no fevers at home. Was working outside in the hot sun for 2 straight days immediately prior to presentation.  Presented to ENT who rx'd augmentin. Is of flonase bid per PCP. In addition, has R sided hearing loss, non pulsatile tinnitus lasting hours to days, sensation of room spinning lasting hours to days. States these ear complaints have been ongoing since his b/l hernia surgeries 2 years ago. Also uses qtips aggressively. ENT consulted for evaluation of head and neck for sources of fever.     PAST MEDICAL & SURGICAL HISTORY:  No pertinent past medical history  Abdominal hernia: x2, bilateral, 2017    No ENT surgeries    PE  NAD, lying in bed, comfortable  Endoscopic View:   Nasal cavity clear b/l, mucous, no pus in middle meatus b/l, +Large septal spur on L, no pus superiorly from middle turbinate  Npx/opx clear  No booling of secretions  Larynx wnl. TVFs symm b/l  No masses  Airway widely patent  Ears:  AS: canal inflamed, clear, TM clear, ME space without effusion  AD: canal inflamed, clear, TM clear, + clear effusion in middle ear space vs small perforation in TM, thickening of TM over umbo, retraction of the drum, +wax over the anterior superior aspect of the TM  Antonio: lateralized to the R  Rinne: A>B b/l    CT Head reviewed: sinuses without e/o infection.   CT Temporal bone: wnl    A/P: 63M with recurrent fevers. C/o of nasal congestion. No e/o of acute infection to cause fevers. Does have ear complaints that are unlikely to be etiology of fevers. DDx for above findings includes Meniere's, retrocochlear pathology.   - continue following up with outside ENT, please provide them with the CT temporal bone on d/c  - Audiogram as outpatient  - MRI IAC as outpatient (following audio) as previous ENT had ordered     d/w chief and attending ,DirectAddress_Unknown

## 2024-04-25 NOTE — PATIENT PROFILE ADULT - NSPROPOAURINARYCATHETER_GEN_A_NUR
Addended by: ADRIENNE WALTERS on: 4/25/2024 09:20 AM     Modules accepted: Orders    
Addended by: AUSTIN MYERS on: 4/19/2024 03:15 PM     Modules accepted: Orders    
no